# Patient Record
Sex: FEMALE | Race: WHITE | NOT HISPANIC OR LATINO | URBAN - METROPOLITAN AREA
[De-identification: names, ages, dates, MRNs, and addresses within clinical notes are randomized per-mention and may not be internally consistent; named-entity substitution may affect disease eponyms.]

---

## 2017-06-17 ENCOUNTER — OUTPATIENT (OUTPATIENT)
Dept: OUTPATIENT SERVICES | Facility: HOSPITAL | Age: 72
LOS: 1 days | Discharge: HOME | End: 2017-06-17

## 2017-06-17 DIAGNOSIS — I10 ESSENTIAL (PRIMARY) HYPERTENSION: ICD-10-CM

## 2017-06-17 DIAGNOSIS — R55 SYNCOPE AND COLLAPSE: ICD-10-CM

## 2017-06-17 DIAGNOSIS — G43.909 MIGRAINE, UNSPECIFIED, NOT INTRACTABLE, WITHOUT STATUS MIGRAINOSUS: ICD-10-CM

## 2017-06-17 PROBLEM — Z00.00 ENCOUNTER FOR PREVENTIVE HEALTH EXAMINATION: Status: ACTIVE | Noted: 2017-06-17

## 2017-06-20 ENCOUNTER — TRANSCRIPTION ENCOUNTER (OUTPATIENT)
Age: 72
End: 2017-06-20

## 2017-06-26 ENCOUNTER — OUTPATIENT (OUTPATIENT)
Dept: OUTPATIENT SERVICES | Facility: HOSPITAL | Age: 72
LOS: 1 days | Discharge: HOME | End: 2017-06-26

## 2017-06-26 DIAGNOSIS — I10 ESSENTIAL (PRIMARY) HYPERTENSION: ICD-10-CM

## 2017-06-26 DIAGNOSIS — G43.909 MIGRAINE, UNSPECIFIED, NOT INTRACTABLE, WITHOUT STATUS MIGRAINOSUS: ICD-10-CM

## 2017-06-26 DIAGNOSIS — R55 SYNCOPE AND COLLAPSE: ICD-10-CM

## 2017-06-28 DIAGNOSIS — F32.0 MAJOR DEPRESSIVE DISORDER, SINGLE EPISODE, MILD: ICD-10-CM

## 2017-06-28 DIAGNOSIS — F03.90 UNSPECIFIED DEMENTIA, UNSPECIFIED SEVERITY, WITHOUT BEHAVIORAL DISTURBANCE, PSYCHOTIC DISTURBANCE, MOOD DISTURBANCE, AND ANXIETY: ICD-10-CM

## 2017-06-28 DIAGNOSIS — N39.0 URINARY TRACT INFECTION, SITE NOT SPECIFIED: ICD-10-CM

## 2017-06-28 DIAGNOSIS — I10 ESSENTIAL (PRIMARY) HYPERTENSION: ICD-10-CM

## 2017-06-28 DIAGNOSIS — S42.001A FRACTURE OF UNSPECIFIED PART OF RIGHT CLAVICLE, INITIAL ENCOUNTER FOR CLOSED FRACTURE: ICD-10-CM

## 2017-06-28 DIAGNOSIS — L82.1 OTHER SEBORRHEIC KERATOSIS: ICD-10-CM

## 2017-06-28 DIAGNOSIS — E78.5 HYPERLIPIDEMIA, UNSPECIFIED: ICD-10-CM

## 2017-06-28 DIAGNOSIS — G43.909 MIGRAINE, UNSPECIFIED, NOT INTRACTABLE, WITHOUT STATUS MIGRAINOSUS: ICD-10-CM

## 2017-09-16 ENCOUNTER — OUTPATIENT (OUTPATIENT)
Dept: OUTPATIENT SERVICES | Facility: HOSPITAL | Age: 72
LOS: 1 days | Discharge: HOME | End: 2017-09-16

## 2017-09-16 DIAGNOSIS — G43.909 MIGRAINE, UNSPECIFIED, NOT INTRACTABLE, WITHOUT STATUS MIGRAINOSUS: ICD-10-CM

## 2017-09-16 DIAGNOSIS — R55 SYNCOPE AND COLLAPSE: ICD-10-CM

## 2017-09-16 DIAGNOSIS — Z01.818 ENCOUNTER FOR OTHER PREPROCEDURAL EXAMINATION: ICD-10-CM

## 2017-09-16 DIAGNOSIS — I10 ESSENTIAL (PRIMARY) HYPERTENSION: ICD-10-CM

## 2017-09-22 ENCOUNTER — OUTPATIENT (OUTPATIENT)
Dept: OUTPATIENT SERVICES | Facility: HOSPITAL | Age: 72
LOS: 1 days | Discharge: HOME | End: 2017-09-22

## 2017-09-22 DIAGNOSIS — G43.909 MIGRAINE, UNSPECIFIED, NOT INTRACTABLE, WITHOUT STATUS MIGRAINOSUS: ICD-10-CM

## 2017-09-22 DIAGNOSIS — I10 ESSENTIAL (PRIMARY) HYPERTENSION: ICD-10-CM

## 2017-09-22 DIAGNOSIS — D50.8 OTHER IRON DEFICIENCY ANEMIAS: ICD-10-CM

## 2017-09-22 DIAGNOSIS — R55 SYNCOPE AND COLLAPSE: ICD-10-CM

## 2018-02-16 ENCOUNTER — OUTPATIENT (OUTPATIENT)
Dept: OUTPATIENT SERVICES | Facility: HOSPITAL | Age: 73
LOS: 1 days | Discharge: HOME | End: 2018-02-16

## 2018-02-16 DIAGNOSIS — Z13.1 ENCOUNTER FOR SCREENING FOR DIABETES MELLITUS: ICD-10-CM

## 2018-02-16 DIAGNOSIS — K21.9 GASTRO-ESOPHAGEAL REFLUX DISEASE WITHOUT ESOPHAGITIS: ICD-10-CM

## 2018-02-16 DIAGNOSIS — M25.561 PAIN IN RIGHT KNEE: ICD-10-CM

## 2018-02-16 DIAGNOSIS — E78.5 HYPERLIPIDEMIA, UNSPECIFIED: ICD-10-CM

## 2018-02-16 DIAGNOSIS — E02 SUBCLINICAL IODINE-DEFICIENCY HYPOTHYROIDISM: ICD-10-CM

## 2018-02-16 DIAGNOSIS — R10.13 EPIGASTRIC PAIN: ICD-10-CM

## 2018-02-16 DIAGNOSIS — I10 ESSENTIAL (PRIMARY) HYPERTENSION: ICD-10-CM

## 2018-02-16 DIAGNOSIS — F32.0 MAJOR DEPRESSIVE DISORDER, SINGLE EPISODE, MILD: ICD-10-CM

## 2018-02-16 DIAGNOSIS — R63.0 ANOREXIA: ICD-10-CM

## 2018-02-24 ENCOUNTER — OUTPATIENT (OUTPATIENT)
Dept: OUTPATIENT SERVICES | Facility: HOSPITAL | Age: 73
LOS: 1 days | Discharge: HOME | End: 2018-02-24

## 2018-02-24 DIAGNOSIS — I10 ESSENTIAL (PRIMARY) HYPERTENSION: ICD-10-CM

## 2018-02-24 DIAGNOSIS — K21.9 GASTRO-ESOPHAGEAL REFLUX DISEASE WITHOUT ESOPHAGITIS: ICD-10-CM

## 2018-02-24 DIAGNOSIS — Z12.31 ENCOUNTER FOR SCREENING MAMMOGRAM FOR MALIGNANT NEOPLASM OF BREAST: ICD-10-CM

## 2018-02-24 DIAGNOSIS — E78.5 HYPERLIPIDEMIA, UNSPECIFIED: ICD-10-CM

## 2018-02-24 DIAGNOSIS — F32.0 MAJOR DEPRESSIVE DISORDER, SINGLE EPISODE, MILD: ICD-10-CM

## 2018-05-11 ENCOUNTER — APPOINTMENT (OUTPATIENT)
Dept: GASTROENTEROLOGY | Facility: CLINIC | Age: 73
End: 2018-05-11

## 2018-06-09 ENCOUNTER — OUTPATIENT (OUTPATIENT)
Dept: OUTPATIENT SERVICES | Facility: HOSPITAL | Age: 73
LOS: 1 days | Discharge: HOME | End: 2018-06-09

## 2018-06-09 DIAGNOSIS — I10 ESSENTIAL (PRIMARY) HYPERTENSION: ICD-10-CM

## 2018-06-09 DIAGNOSIS — K21.9 GASTRO-ESOPHAGEAL REFLUX DISEASE WITHOUT ESOPHAGITIS: ICD-10-CM

## 2018-06-09 DIAGNOSIS — H04.123 DRY EYE SYNDROME OF BILATERAL LACRIMAL GLANDS: ICD-10-CM

## 2018-06-09 DIAGNOSIS — F32.0 MAJOR DEPRESSIVE DISORDER, SINGLE EPISODE, MILD: ICD-10-CM

## 2018-06-09 DIAGNOSIS — E78.5 HYPERLIPIDEMIA, UNSPECIFIED: ICD-10-CM

## 2018-06-29 ENCOUNTER — OUTPATIENT (OUTPATIENT)
Dept: OUTPATIENT SERVICES | Facility: HOSPITAL | Age: 73
LOS: 1 days | Discharge: HOME | End: 2018-06-29

## 2018-06-29 DIAGNOSIS — I65.23 OCCLUSION AND STENOSIS OF BILATERAL CAROTID ARTERIES: ICD-10-CM

## 2018-06-29 DIAGNOSIS — M54.17 RADICULOPATHY, LUMBOSACRAL REGION: ICD-10-CM

## 2018-10-24 ENCOUNTER — OUTPATIENT (OUTPATIENT)
Dept: OUTPATIENT SERVICES | Facility: HOSPITAL | Age: 73
LOS: 1 days | Discharge: HOME | End: 2018-10-24

## 2018-10-24 DIAGNOSIS — R20.2 PARESTHESIA OF SKIN: ICD-10-CM

## 2018-10-24 DIAGNOSIS — M54.17 RADICULOPATHY, LUMBOSACRAL REGION: ICD-10-CM

## 2018-10-24 DIAGNOSIS — F32.0 MAJOR DEPRESSIVE DISORDER, SINGLE EPISODE, MILD: ICD-10-CM

## 2018-10-24 DIAGNOSIS — E78.5 HYPERLIPIDEMIA, UNSPECIFIED: ICD-10-CM

## 2018-10-24 DIAGNOSIS — I10 ESSENTIAL (PRIMARY) HYPERTENSION: ICD-10-CM

## 2018-10-24 DIAGNOSIS — K21.9 GASTRO-ESOPHAGEAL REFLUX DISEASE WITHOUT ESOPHAGITIS: ICD-10-CM

## 2018-10-24 DIAGNOSIS — L82.1 OTHER SEBORRHEIC KERATOSIS: ICD-10-CM

## 2018-10-24 DIAGNOSIS — I65.23 OCCLUSION AND STENOSIS OF BILATERAL CAROTID ARTERIES: ICD-10-CM

## 2019-03-01 ENCOUNTER — OUTPATIENT (OUTPATIENT)
Dept: OUTPATIENT SERVICES | Facility: HOSPITAL | Age: 74
LOS: 1 days | Discharge: HOME | End: 2019-03-01

## 2019-03-01 DIAGNOSIS — G43.909 MIGRAINE, UNSPECIFIED, NOT INTRACTABLE, WITHOUT STATUS MIGRAINOSUS: ICD-10-CM

## 2019-03-01 DIAGNOSIS — E55.9 VITAMIN D DEFICIENCY, UNSPECIFIED: ICD-10-CM

## 2019-03-01 DIAGNOSIS — I10 ESSENTIAL (PRIMARY) HYPERTENSION: ICD-10-CM

## 2019-03-01 DIAGNOSIS — E02 SUBCLINICAL IODINE-DEFICIENCY HYPOTHYROIDISM: ICD-10-CM

## 2019-03-01 DIAGNOSIS — E78.5 HYPERLIPIDEMIA, UNSPECIFIED: ICD-10-CM

## 2019-03-01 DIAGNOSIS — M54.17 RADICULOPATHY, LUMBOSACRAL REGION: ICD-10-CM

## 2019-03-01 DIAGNOSIS — R20.2 PARESTHESIA OF SKIN: ICD-10-CM

## 2019-03-01 DIAGNOSIS — Z13.1 ENCOUNTER FOR SCREENING FOR DIABETES MELLITUS: ICD-10-CM

## 2019-03-01 DIAGNOSIS — F32.0 MAJOR DEPRESSIVE DISORDER, SINGLE EPISODE, MILD: ICD-10-CM

## 2019-03-13 ENCOUNTER — OUTPATIENT (OUTPATIENT)
Dept: OUTPATIENT SERVICES | Facility: HOSPITAL | Age: 74
LOS: 1 days | Discharge: HOME | End: 2019-03-13

## 2019-03-13 DIAGNOSIS — Z12.31 ENCOUNTER FOR SCREENING MAMMOGRAM FOR MALIGNANT NEOPLASM OF BREAST: ICD-10-CM

## 2019-07-05 ENCOUNTER — OUTPATIENT (OUTPATIENT)
Dept: OUTPATIENT SERVICES | Facility: HOSPITAL | Age: 74
LOS: 1 days | Discharge: HOME | End: 2019-07-05

## 2019-07-05 DIAGNOSIS — F32.0 MAJOR DEPRESSIVE DISORDER, SINGLE EPISODE, MILD: ICD-10-CM

## 2019-07-05 DIAGNOSIS — I10 ESSENTIAL (PRIMARY) HYPERTENSION: ICD-10-CM

## 2019-07-05 DIAGNOSIS — E78.5 HYPERLIPIDEMIA, UNSPECIFIED: ICD-10-CM

## 2019-07-05 DIAGNOSIS — H61.21 IMPACTED CERUMEN, RIGHT EAR: ICD-10-CM

## 2019-07-05 DIAGNOSIS — R20.2 PARESTHESIA OF SKIN: ICD-10-CM

## 2019-07-08 ENCOUNTER — OUTPATIENT (OUTPATIENT)
Dept: OUTPATIENT SERVICES | Facility: HOSPITAL | Age: 74
LOS: 1 days | Discharge: HOME | End: 2019-07-08

## 2019-07-08 DIAGNOSIS — N39.0 URINARY TRACT INFECTION, SITE NOT SPECIFIED: ICD-10-CM

## 2019-11-08 ENCOUNTER — OUTPATIENT (OUTPATIENT)
Dept: OUTPATIENT SERVICES | Facility: HOSPITAL | Age: 74
LOS: 1 days | Discharge: HOME | End: 2019-11-08

## 2019-11-08 DIAGNOSIS — F32.0 MAJOR DEPRESSIVE DISORDER, SINGLE EPISODE, MILD: ICD-10-CM

## 2019-11-08 DIAGNOSIS — R42 DIZZINESS AND GIDDINESS: ICD-10-CM

## 2019-11-08 DIAGNOSIS — I10 ESSENTIAL (PRIMARY) HYPERTENSION: ICD-10-CM

## 2019-11-08 DIAGNOSIS — E78.5 HYPERLIPIDEMIA, UNSPECIFIED: ICD-10-CM

## 2019-11-08 DIAGNOSIS — M25.511 PAIN IN RIGHT SHOULDER: ICD-10-CM

## 2020-05-29 ENCOUNTER — OUTPATIENT (OUTPATIENT)
Dept: OUTPATIENT SERVICES | Facility: HOSPITAL | Age: 75
LOS: 1 days | Discharge: HOME | End: 2020-05-29
Payer: MEDICARE

## 2020-05-29 DIAGNOSIS — Z12.31 ENCOUNTER FOR SCREENING MAMMOGRAM FOR MALIGNANT NEOPLASM OF BREAST: ICD-10-CM

## 2020-05-29 PROCEDURE — 77067 SCR MAMMO BI INCL CAD: CPT | Mod: 26

## 2020-05-29 PROCEDURE — 77063 BREAST TOMOSYNTHESIS BI: CPT | Mod: 26

## 2020-06-01 DIAGNOSIS — N95.9 UNSPECIFIED MENOPAUSAL AND PERIMENOPAUSAL DISORDER: ICD-10-CM

## 2020-06-01 DIAGNOSIS — Z13.820 ENCOUNTER FOR SCREENING FOR OSTEOPOROSIS: ICD-10-CM

## 2020-07-31 ENCOUNTER — OUTPATIENT (OUTPATIENT)
Dept: OUTPATIENT SERVICES | Facility: HOSPITAL | Age: 75
LOS: 1 days | Discharge: HOME | End: 2020-07-31
Payer: MEDICARE

## 2020-07-31 DIAGNOSIS — R41.3 OTHER AMNESIA: ICD-10-CM

## 2020-07-31 PROCEDURE — 70551 MRI BRAIN STEM W/O DYE: CPT | Mod: 26

## 2021-04-28 ENCOUNTER — OUTPATIENT (OUTPATIENT)
Dept: OUTPATIENT SERVICES | Facility: HOSPITAL | Age: 76
LOS: 1 days | Discharge: HOME | End: 2021-04-28
Payer: MEDICARE

## 2021-04-28 DIAGNOSIS — R22.0 LOCALIZED SWELLING, MASS AND LUMP, HEAD: ICD-10-CM

## 2021-04-28 PROCEDURE — 71046 X-RAY EXAM CHEST 2 VIEWS: CPT | Mod: 26

## 2021-04-28 PROCEDURE — 76536 US EXAM OF HEAD AND NECK: CPT | Mod: 26

## 2021-08-05 ENCOUNTER — APPOINTMENT (OUTPATIENT)
Dept: VASCULAR SURGERY | Facility: CLINIC | Age: 76
End: 2021-08-05
Payer: MEDICARE

## 2021-08-05 VITALS
BODY MASS INDEX: 25.61 KG/M2 | HEART RATE: 88 BPM | DIASTOLIC BLOOD PRESSURE: 84 MMHG | TEMPERATURE: 98 F | HEIGHT: 64 IN | WEIGHT: 150 LBS | SYSTOLIC BLOOD PRESSURE: 157 MMHG

## 2021-08-05 DIAGNOSIS — Z87.891 PERSONAL HISTORY OF NICOTINE DEPENDENCE: ICD-10-CM

## 2021-08-05 PROCEDURE — 99204 OFFICE O/P NEW MOD 45 MIN: CPT

## 2021-08-05 PROCEDURE — 93880 EXTRACRANIAL BILAT STUDY: CPT

## 2021-08-05 NOTE — ASSESSMENT
[FreeTextEntry1] : 77 y/o female who underwent screening PVRs done that showed ARNOLDO of 0.91 on right and 1.05 on left. denies any claudication, c/o forgetfulness, and also felt lump to left neck.\par \par Carotid duplex showed <50% ICA stenosis bilaterally. She maintains pulses in the lower extremities bilaterally.\par \par No vascular surgical intervention is needed. She can follow up as needed.\par \par

## 2021-08-05 NOTE — DATA REVIEWED
[FreeTextEntry1] : I performed a carotid duplex which was medically necessary to evaluate for carotid stenosis. It showed <50% ICA stenosis bilaterally.\par

## 2021-08-05 NOTE — HISTORY OF PRESENT ILLNESS
[FreeTextEntry1] : 75 y/o female who underwent screening PVRs done that showed ARNOLDO of 0.91 on right and 1.05 on left. denies any claudication, c/o forgetfulness, and also felt lump to left neck.

## 2021-11-27 ENCOUNTER — OUTPATIENT (OUTPATIENT)
Dept: OUTPATIENT SERVICES | Facility: HOSPITAL | Age: 76
LOS: 1 days | Discharge: HOME | End: 2021-11-27
Payer: MEDICARE

## 2021-11-27 DIAGNOSIS — R10.9 UNSPECIFIED ABDOMINAL PAIN: ICD-10-CM

## 2021-11-27 PROCEDURE — 74177 CT ABD & PELVIS W/CONTRAST: CPT | Mod: 26

## 2022-01-19 ENCOUNTER — INPATIENT (INPATIENT)
Facility: HOSPITAL | Age: 77
LOS: 1 days | Discharge: HOME | End: 2022-01-21
Attending: INTERNAL MEDICINE | Admitting: INTERNAL MEDICINE
Payer: MEDICARE

## 2022-01-19 VITALS
DIASTOLIC BLOOD PRESSURE: 68 MMHG | OXYGEN SATURATION: 98 % | SYSTOLIC BLOOD PRESSURE: 124 MMHG | HEART RATE: 62 BPM | TEMPERATURE: 96 F | HEIGHT: 64 IN | WEIGHT: 169.98 LBS | RESPIRATION RATE: 18 BRPM

## 2022-01-19 LAB
ALBUMIN SERPL ELPH-MCNC: 4.5 G/DL — SIGNIFICANT CHANGE UP (ref 3.5–5.2)
ALP SERPL-CCNC: 94 U/L — SIGNIFICANT CHANGE UP (ref 30–115)
ALT FLD-CCNC: 11 U/L — SIGNIFICANT CHANGE UP (ref 0–41)
ANION GAP SERPL CALC-SCNC: 12 MMOL/L — SIGNIFICANT CHANGE UP (ref 7–14)
APPEARANCE UR: CLEAR — SIGNIFICANT CHANGE UP
APTT BLD: 32.7 SEC — SIGNIFICANT CHANGE UP (ref 27–39.2)
AST SERPL-CCNC: 15 U/L — SIGNIFICANT CHANGE UP (ref 0–41)
BACTERIA # UR AUTO: ABNORMAL
BASOPHILS # BLD AUTO: 0.04 K/UL — SIGNIFICANT CHANGE UP (ref 0–0.2)
BASOPHILS NFR BLD AUTO: 0.5 % — SIGNIFICANT CHANGE UP (ref 0–1)
BILIRUB SERPL-MCNC: 0.4 MG/DL — SIGNIFICANT CHANGE UP (ref 0.2–1.2)
BILIRUB UR-MCNC: NEGATIVE — SIGNIFICANT CHANGE UP
BUN SERPL-MCNC: 20 MG/DL — SIGNIFICANT CHANGE UP (ref 10–20)
CALCIUM SERPL-MCNC: 9.4 MG/DL — SIGNIFICANT CHANGE UP (ref 8.5–10.1)
CHLORIDE SERPL-SCNC: 105 MMOL/L — SIGNIFICANT CHANGE UP (ref 98–110)
CO2 SERPL-SCNC: 26 MMOL/L — SIGNIFICANT CHANGE UP (ref 17–32)
COD CRY URNS QL: NEGATIVE — SIGNIFICANT CHANGE UP
COLOR SPEC: YELLOW — SIGNIFICANT CHANGE UP
CREAT SERPL-MCNC: 1 MG/DL — SIGNIFICANT CHANGE UP (ref 0.7–1.5)
DIFF PNL FLD: ABNORMAL
EOSINOPHIL # BLD AUTO: 0.09 K/UL — SIGNIFICANT CHANGE UP (ref 0–0.7)
EOSINOPHIL NFR BLD AUTO: 1.1 % — SIGNIFICANT CHANGE UP (ref 0–8)
EPI CELLS # UR: ABNORMAL /HPF
GLUCOSE SERPL-MCNC: 100 MG/DL — HIGH (ref 70–99)
GLUCOSE UR QL: NEGATIVE MG/DL — SIGNIFICANT CHANGE UP
GRAN CASTS # UR COMP ASSIST: NEGATIVE — SIGNIFICANT CHANGE UP
HCT VFR BLD CALC: 38 % — SIGNIFICANT CHANGE UP (ref 37–47)
HGB BLD-MCNC: 12.7 G/DL — SIGNIFICANT CHANGE UP (ref 12–16)
HYALINE CASTS # UR AUTO: ABNORMAL /LPF
IMM GRANULOCYTES NFR BLD AUTO: 0.2 % — SIGNIFICANT CHANGE UP (ref 0.1–0.3)
INR BLD: 1.07 RATIO — SIGNIFICANT CHANGE UP (ref 0.65–1.3)
KETONES UR-MCNC: NEGATIVE — SIGNIFICANT CHANGE UP
LEUKOCYTE ESTERASE UR-ACNC: ABNORMAL
LYMPHOCYTES # BLD AUTO: 1.42 K/UL — SIGNIFICANT CHANGE UP (ref 1.2–3.4)
LYMPHOCYTES # BLD AUTO: 17 % — LOW (ref 20.5–51.1)
MAGNESIUM SERPL-MCNC: 1.7 MG/DL — LOW (ref 1.8–2.4)
MCHC RBC-ENTMCNC: 29.4 PG — SIGNIFICANT CHANGE UP (ref 27–31)
MCHC RBC-ENTMCNC: 33.4 G/DL — SIGNIFICANT CHANGE UP (ref 32–37)
MCV RBC AUTO: 88 FL — SIGNIFICANT CHANGE UP (ref 81–99)
MONOCYTES # BLD AUTO: 0.44 K/UL — SIGNIFICANT CHANGE UP (ref 0.1–0.6)
MONOCYTES NFR BLD AUTO: 5.3 % — SIGNIFICANT CHANGE UP (ref 1.7–9.3)
NEUTROPHILS # BLD AUTO: 6.35 K/UL — SIGNIFICANT CHANGE UP (ref 1.4–6.5)
NEUTROPHILS NFR BLD AUTO: 75.9 % — HIGH (ref 42.2–75.2)
NITRITE UR-MCNC: NEGATIVE — SIGNIFICANT CHANGE UP
NRBC # BLD: 0 /100 WBCS — SIGNIFICANT CHANGE UP (ref 0–0)
PH UR: 7 — SIGNIFICANT CHANGE UP (ref 5–8)
PLATELET # BLD AUTO: 261 K/UL — SIGNIFICANT CHANGE UP (ref 130–400)
POTASSIUM SERPL-MCNC: 3.7 MMOL/L — SIGNIFICANT CHANGE UP (ref 3.5–5)
POTASSIUM SERPL-SCNC: 3.7 MMOL/L — SIGNIFICANT CHANGE UP (ref 3.5–5)
PROT SERPL-MCNC: 6.6 G/DL — SIGNIFICANT CHANGE UP (ref 6–8)
PROT UR-MCNC: NEGATIVE MG/DL — SIGNIFICANT CHANGE UP
PROTHROM AB SERPL-ACNC: 12.3 SEC — SIGNIFICANT CHANGE UP (ref 9.95–12.87)
RBC # BLD: 4.32 M/UL — SIGNIFICANT CHANGE UP (ref 4.2–5.4)
RBC # FLD: 12.1 % — SIGNIFICANT CHANGE UP (ref 11.5–14.5)
RBC CASTS # UR COMP ASSIST: SIGNIFICANT CHANGE UP /HPF
SARS-COV-2 RNA SPEC QL NAA+PROBE: SIGNIFICANT CHANGE UP
SODIUM SERPL-SCNC: 143 MMOL/L — SIGNIFICANT CHANGE UP (ref 135–146)
SP GR SPEC: 1.01 — SIGNIFICANT CHANGE UP (ref 1.01–1.03)
TRI-PHOS CRY UR QL COMP ASSIST: NEGATIVE — SIGNIFICANT CHANGE UP
TROPONIN T SERPL-MCNC: <0.01 NG/ML — SIGNIFICANT CHANGE UP
URATE CRY FLD QL MICRO: NEGATIVE — SIGNIFICANT CHANGE UP
UROBILINOGEN FLD QL: 0.2 MG/DL — SIGNIFICANT CHANGE UP
WBC # BLD: 8.36 K/UL — SIGNIFICANT CHANGE UP (ref 4.8–10.8)
WBC # FLD AUTO: 8.36 K/UL — SIGNIFICANT CHANGE UP (ref 4.8–10.8)
WBC UR QL: ABNORMAL /HPF

## 2022-01-19 PROCEDURE — 93010 ELECTROCARDIOGRAM REPORT: CPT

## 2022-01-19 PROCEDURE — 71045 X-RAY EXAM CHEST 1 VIEW: CPT | Mod: 26

## 2022-01-19 PROCEDURE — 99221 1ST HOSP IP/OBS SF/LOW 40: CPT

## 2022-01-19 PROCEDURE — 70450 CT HEAD/BRAIN W/O DYE: CPT | Mod: 26,MA

## 2022-01-19 PROCEDURE — 99285 EMERGENCY DEPT VISIT HI MDM: CPT

## 2022-01-19 RX ORDER — SODIUM CHLORIDE 9 MG/ML
1000 INJECTION INTRAMUSCULAR; INTRAVENOUS; SUBCUTANEOUS
Refills: 0 | Status: DISCONTINUED | OUTPATIENT
Start: 2022-01-19 | End: 2022-01-20

## 2022-01-19 RX ORDER — SODIUM CHLORIDE 9 MG/ML
1000 INJECTION, SOLUTION INTRAVENOUS ONCE
Refills: 0 | Status: COMPLETED | OUTPATIENT
Start: 2022-01-19 | End: 2022-01-19

## 2022-01-19 RX ORDER — MAGNESIUM OXIDE 400 MG ORAL TABLET 241.3 MG
400 TABLET ORAL ONCE
Refills: 0 | Status: COMPLETED | OUTPATIENT
Start: 2022-01-19 | End: 2022-01-19

## 2022-01-19 RX ADMIN — SODIUM CHLORIDE 75 MILLILITER(S): 9 INJECTION INTRAMUSCULAR; INTRAVENOUS; SUBCUTANEOUS at 23:12

## 2022-01-19 RX ADMIN — MAGNESIUM OXIDE 400 MG ORAL TABLET 400 MILLIGRAM(S): 241.3 TABLET ORAL at 23:12

## 2022-01-19 RX ADMIN — SODIUM CHLORIDE 1000 MILLILITER(S): 9 INJECTION, SOLUTION INTRAVENOUS at 17:17

## 2022-01-19 NOTE — H&P ADULT - NSHPLABSRESULTS_GEN_ALL_CORE
12.7   8.36  )-----------( 261      ( 19 Jan 2022 17:20 )             38.0     01-19    143  |  105  |  20  ----------------------------<  100<H>  3.7   |  26  |  1.0    Ca    9.4      19 Jan 2022 17:20  Mg     1.7     01-19    TPro  6.6  /  Alb  4.5  /  TBili  0.4  /  DBili  x   /  AST  15  /  ALT  11  /  AlkPhos  94  01-19            PT/INR - ( 19 Jan 2022 17:20 )   PT: 12.30 sec;   INR: 1.07 ratio         PTT - ( 19 Jan 2022 17:20 )  PTT:32.7 sec  Lactate Trend    CARDIAC MARKERS ( 19 Jan 2022 17:20 )  x     / <0.01 ng/mL / x     / x     / x        CAPILLARY BLOOD GLUCOSE    < from: CT Head No Cont (01.19.22 @ 17:28) >    ACC: [id number] EXAM:  CT BRAIN                          PROCEDURE DATE:  01/19/2022      < from: CT Head No Cont (01.19.22 @ 17:28) >    pression:    1. No evidence of acute intracranial hemorrhage or mass effect.    SPENCER MCKEON MD; Attending Radiologist  This document has been electronically signed. Jan 19 2022  6:14PM    < end of copied text >

## 2022-01-19 NOTE — ED ADULT NURSE NOTE - CHPI ED NUR SEVERITY2
Racheal S [facility]                        To Whom It May Concern,    This is to certify that Racheal was seen in the clinic on 5/17/2018.  She may return to work without restrictions on 6/11/18.    REMARKS:         SIGNATURE:_________________________________________, 5/17/2018     Odette Whatley MD                               [facility]  Obstetrics and Gynecology   [address] [place] [address]  [phone]    
PAIN SCALE 5 OF 10.

## 2022-01-19 NOTE — ED PROVIDER NOTE - CLINICAL SUMMARY MEDICAL DECISION MAKING FREE TEXT BOX
Labs and imaging obtained. Results reviewed and discussed with pt and daughter,  Given that this is second episode will plan to admit

## 2022-01-19 NOTE — H&P ADULT - ASSESSMENT
Near syncope    magnesium deficiency Near syncope- telemetry, repeat cardiac enzymes in am with EKG, ECHO    magnesium deficiency- suuplement    medication management- Patient doesn't know names but says they are all "life style" medications

## 2022-01-19 NOTE — H&P ADULT - HISTORY OF PRESENT ILLNESS
75yo female [resents to the ER after collapsing while in a supermarket. She tells me she became weak and started falling to the ground but was prevented from hitting fall by bystanders. Tells me she did not have actual loss of consciousness. Also denies chest pain, palpitations or headache but adds she didn't eat anything today. She also tells me this has happened once before and at that time she was observed in the hospital ([facility] and then sent home

## 2022-01-19 NOTE — ED ADULT TRIAGE NOTE - CHIEF COMPLAINT QUOTE
BIBA, as per EMS "pt was on line at [employer], near syncopal episode, BP was low on scene, IV access obtained 1LNS given in field"

## 2022-01-19 NOTE — ED ADULT NURSE REASSESSMENT NOTE - NS ED NURSE REASSESS COMMENT FT1
Report received from Lillian RENE. Pt received aaox3 in NAD. Pt placed on cardiac monitor with NSR noted. Pt denies any pain at present time. Pt pending admission orders.

## 2022-01-19 NOTE — ED PROVIDER NOTE - ATTENDING CONTRIBUTION TO CARE
75 yo F presents for evaluation after passing out in store.  Pt states she felt lightheaded and felt herself falling  States she was helped by bystanders and does not think she lost consciousness. + vomiting after incident, Found with low BP by EMS,  Responded well to IVFs, no CP, no SOB, no numbness or tingling, Family at bedside states that this happened once before.  Pt can not recall her meds, but daughter states that pt was on Namenda but stopped taking it.  On exam pt in NAD AAO x 3, no signs of head trauma, PERRL, speech is clear and fluent, face symmetrical, MMM, Lungs cta b/l, abd soft nt nd, no edema, good tone, equal strength,

## 2022-01-19 NOTE — ED PROVIDER NOTE - CARDIAC, MLM
See scanned session report.   Normal rate, regular rhythm.  Heart sounds S1, S2.  No murmurs, rubs or gallops.

## 2022-01-19 NOTE — ED ADULT NURSE NOTE - OBJECTIVE STATEMENT
Near syncope occurred at the store. Denies hitting head. lowered to floor by bystander, denies hitting head. NO cp/sob/N/V/D. No palpitations. Cannot remember her meds but reports self dc her namenda and aricept as "they make me feel ill"

## 2022-01-19 NOTE — ED PROVIDER NOTE - OBJECTIVE STATEMENT
Patient c/o near syncope at store today, Found by EMS< low BP, now better,, no chest pain, no SOB, no abdominal pain, no back pain, Had similar episode 6 mos ago but never seen by MD.

## 2022-01-20 ENCOUNTER — TRANSCRIPTION ENCOUNTER (OUTPATIENT)
Age: 77
End: 2022-01-20

## 2022-01-20 LAB — TROPONIN T SERPL-MCNC: <0.01 NG/ML — SIGNIFICANT CHANGE UP

## 2022-01-20 PROCEDURE — 93010 ELECTROCARDIOGRAM REPORT: CPT

## 2022-01-20 PROCEDURE — 99233 SBSQ HOSP IP/OBS HIGH 50: CPT

## 2022-01-20 PROCEDURE — 33285 INSJ SUBQ CAR RHYTHM MNTR: CPT

## 2022-01-20 PROCEDURE — 99223 1ST HOSP IP/OBS HIGH 75: CPT | Mod: 57,25

## 2022-01-20 RX ORDER — SODIUM CHLORIDE 9 MG/ML
1000 INJECTION, SOLUTION INTRAVENOUS ONCE
Refills: 0 | Status: COMPLETED | OUTPATIENT
Start: 2022-01-20 | End: 2022-01-20

## 2022-01-20 RX ORDER — CEPHALEXIN 500 MG
500 CAPSULE ORAL ONCE
Refills: 0 | Status: COMPLETED | OUTPATIENT
Start: 2022-01-20 | End: 2022-01-20

## 2022-01-20 RX ADMIN — Medication 500 MILLIGRAM(S): at 14:50

## 2022-01-20 RX ADMIN — SODIUM CHLORIDE 1000 MILLILITER(S): 9 INJECTION, SOLUTION INTRAVENOUS at 10:26

## 2022-01-20 NOTE — DISCHARGE NOTE PROVIDER - CARE PROVIDERS DIRECT ADDRESSES
[email or web address]

## 2022-01-20 NOTE — DISCHARGE NOTE PROVIDER - NSDCFUADDAPPT_GEN_ALL_CORE_FT
Please attend your appointment with Dr Smalls. It is important you let him know that you need a referral to a cardiologist to work up your mitral regurgitation.     Please make an appointment to see your primary care doctor. You are having worsening memory problems that likely need further management.

## 2022-01-20 NOTE — CHART NOTE - NSCHARTNOTEFT_GEN_A_CORE
EP PROCEDURE NOTE    Date of Procedure: 01-20-22  EP Attending: Dr. Smalls  Assistant: CAROL ANN Jansen  Referring Physician: Dr. Hicks  Procedure: Loop Recorder Implant    Indication: 76y Female with history of syncope referred for implantable loop recorder.     Anesthesia: Local    EQUIPMENT IMPLANTED  : [employer]onic Linq  Model Number: [id number]  Serial Number: [id number]    PROCEDURE DESCRIPTION  The patient was brought to the electrophysiology procedure area in a non-sedated state and received preoperative antibiotics. Informed, written consent was obtained prior to the procedure. The left anterior chest region was prepped and cleaned from the nipple to the upper left clavicular border with serial applications of Chlorhexidine. Patient was then covered with sterile drapes in the usual manner. Blood pressure, oxygenation, and level of comfort were stable throughout.     Following infiltration with local anesthetic, a 1-cm incision was made along the left sternal border at the fourth intercostal space. Using the tunneling device the implantable loop recorder was inserted into the subcutaneous tissue. Direct pressure was applied to the wound to obtain hemostasis. The wound was approximated with Dermabond. Steri-strips and a dry, sterile dressing were placed over the wound. R waves were noted to be .35 mV.     The patient tolerated the procedure well.     COMPLICATIONS  No immediate complications    CONCLUSIONS  Successful loop recorder implant    EBL: 2 cc

## 2022-01-20 NOTE — PROGRESS NOTE ADULT - ASSESSMENT
#presyncope  -sudden onset generalized weakness, chest pressure, palpitations prior to presyncopal episode  -no LOC, no postictal state, no diaphoresis  -several prior episodes; second time hospitalized for this issue  -Trops neg, EKG with trifascicular block pending repeat EKG        Near syncope- telemetry, repeat cardiac enzymes in am with EKG, ECHO    magnesium deficiency- suuplement    medication management- Patient doesn't know names but says they are all "life style" medications      #presyncope  -sudden onset generalized weakness, chest pressure, palpitations prior to presyncopal episode  -no LOC, no postictal state, no diaphoresis  -several prior episodes; second time hospitalized for this issue  -Trops neg, EKG with trifascicular block pending repeat EKG  -pending repeat trops, repeat ekg; ep consult for abnormal ekg on admission    #medication management  -Patient doesn't know names but says they are all "life style" medications    #Dementia likely alzheimer's   -pt with intermittent Short term memory loss, worsening and severe per daughter  -pt does not follow up with PMD; Dr Waters  -per daughter was on namenda, not compliant  -CT head neg for acute pathology

## 2022-01-20 NOTE — PATIENT PROFILE ADULT - FALL HARM RISK - HARM RISK INTERVENTIONS

## 2022-01-20 NOTE — DISCHARGE NOTE PROVIDER - NSDCFUADDINST_GEN_ALL_CORE_FT
You should follow up with Dr Smalls, for your EP appointment regarding your loop recorder. Please let him know you need an appointment with a cardiologist to help work up your mitral regurgitation. The cardiology consult team was contacted on the day of your discharge. They recommend you ask Dr Smalls for a cardiology refferal as no cardiologist saw you this admission.     Echocardiogram: A copy of your echocardiogram results were given to you on discharge. Please take this to your primary care doctor and electrophysiologist appointment.

## 2022-01-20 NOTE — PROGRESS NOTE ADULT - ASSESSMENT
77 yo female with PMH of Dementia was brought to ED by EMS after episode of dizziness and pre-syncope, patient was at store standing waiting for the  when she felt dizzy, lightheaded weak and was going to fall, a standby person grabbed the patient helped her to sit on the chair, she didn't lose her consciousness, she also felt chest discomfort and palpitation, when the EMS arrived her BP was low. In the ED /68 HR 62, EKG showed RBBB, LAFB and 1st deg AV block. Troponin was negative.     A/P:   Pre-Syncope  Orthostatic Hypotension: likely the cause of dizziness, possibly from poor oral intake vs autonomic dysfunction.   Neuro exam showed normal CN II-XII, strength 5/5 in all limbs. Cardiac exam no significant murmurs.   EKG showed trifascicular block RBBB, LAFB and 1st degree AV block, NST changes.   Troponin negative.   Orthostatic changes positive.   This is the 2nd episode per patient.   Trifascicular unclear if significant, consult EP, possibly need further cardiac monitor outpatient.   Get echo, continue telemetry monitor.   IV fluid challenge, recheck orthostatic.     Dementia: progressive, likely Alzheimer's   Patient is taking medications per daughter.   Head CT was negative.,   Neurology follow up outpatient.     #Progress Note Handoff:  Pending (specify): EP, echo.   Family discussion: with daughter at bedside, discussed plan of care.   Disposition: Home.

## 2022-01-20 NOTE — DISCHARGE NOTE PROVIDER - HOSPITAL COURSE
HPI:  75yo female [resents to the ER after collapsing while in a supermarket. She tells me she became weak and started falling to the ground but was prevented from hitting fall by bystanders. Tells me she did not have actual loss of consciousness. Also denies chest pain, palpitations or headache but adds she didn't eat anything today. She also tells me this has happened once before and at that time she was observed in the hospital ([facility] and then sent home  (19 Jan 2022 19:43)    Hospital course  near syncope  episode of near syncope in supermarket. CT head negative. Trops negative. Ortho statics positive in ED, received IV fluids. Repeat orthostatics morning after again posititve. Received one liter bolus. Pt responded and no longer orthostatic positive. Will repeat orthostatics in one day. EKG showed trifascicular block RBBB, LAFB and 1st degree AV block. With hx of multiple presyncopal events and second hospitalization, EP was consulted and placed loop recorder. Pt to follow up with Dr Smalls o/p.     Other issues  Dementia  -on meds @ home per daughter  -noncompliant with meds and f/u appts  -worsening memory   HPI:  77yo female [resents to the ER after collapsing while in a supermarket. She tells me she became weak and started falling to the ground but was prevented from hitting fall by bystanders. Tells me she did not have actual loss of consciousness. Also denies chest pain, palpitations or headache but adds she didn't eat anything today. She also tells me this has happened once before and at that time she was observed in the hospital ([facility] and then sent home  (19 Jan 2022 19:43)    Hospital course  near syncope  episode of near syncope in supermarket. CT head negative. Trops negative. Ortho statics positive in ED, received IV fluids. Repeat orthostatics morning after again posititve. Received one liter bolus. Pt responded and no longer orthostatic positive. Will repeat orthostatics in one day. EKG showed trifascicular block RBBB, LAFB and 1st degree AV block. With hx of multiple presyncopal events and second hospitalization, EP was consulted and placed loop recorder. Pt to follow up with Dr Smalls o/p.     Other issues  Dementia  -on meds @ home per daughter  -noncompliant with meds and f/u appts  -worsening memory    dlfjf HPI:  75yo female [resents to the ER after collapsing while in a supermarket. She tells me she became weak and started falling to the ground but was prevented from hitting fall by bystanders. Tells me she did not have actual loss of consciousness. Also denies chest pain, palpitations or headache but adds she didn't eat anything today. She also tells me this has happened once before and at that time she was observed in the hospital ([facility] and then sent home  (19 Jan 2022 19:43)    Hospital course  near syncope  episode of near syncope in supermarket. CT head negative. Trops negative. Ortho statics positive in ED, received IV fluids. Repeat orthostatics morning after again posititve. Received one liter bolus. Pt responded and no longer orthostatic positive. Will repeat orthostatics in one day. EKG showed trifascicular block RBBB, LAFB and 1st degree AV block. With hx of multiple presyncopal events and second hospitalization, EP was consulted and placed loop recorder. Pt to follow up with Dr Smalls o/p. Echo shows mod-severe mitral regurgitaion. 60% EF. Pt needs to f/u o/p with cardiologist and ep. Pt informed of care and aftercare instructions. Pt's daughter also informed. Pt is stable for discharge. Vitals stable.     Other issues  Dementia  -on meds @ home per daughter  -noncompliant with meds and f/u appts  -worsening memory

## 2022-01-20 NOTE — CONSULT NOTE ADULT - SUBJECTIVE AND OBJECTIVE BOX
HPI:  77yo female [resents to the ER after collapsing while in a supermarket. She tells me she became weak and started falling to the ground but was prevented from hitting fall by bystanders. Tells me she did not have actual loss of consciousness. Also denies chest pain, palpitations or headache but adds she didn't eat anything today. She also tells me this has happened once before and at that time she was observed in the hospital ([facility] and then sent home  (19 Jan 2022 19:43)    These episodes have been happening on multiple occasions for the last 3 years    PAST MEDICAL & SURGICAL HISTORY  HTN          FAMILY HISTORY:  no cad        SOCIAL HISTORY:  [x]exsmoker  []Alcohol  []Drug    ALLERGIES:  No Known Allergies      HOME MEDICATIONS:  amlodipine   hctz      VITALS:   T(F): 97.6 (01-20 @ 04:44), Max: 97.8 (01-19 @ 19:50)  HR: 90 (01-20 @ 00:36) (62 - 90)  BP: 153/76 (01-20 @ 00:36) (124/68 - 153/76)  BP(mean): --  RR: 18 (01-20 @ 04:44) (17 - 18)  SpO2: 99% (01-20 @ 00:36) (97% - 99%)    I&O's Summary    19 Jan 2022 07:01  -  20 Jan 2022 07:00  --------------------------------------------------------  IN: 420 mL / OUT: 400 mL / NET: 20 mL        REVIEW OF SYSTEMS:  CONSTITUTIONAL: No weakness, fevers or chills  EYES: No visual changes  ENT: (+) dizziness no throat pain   NECK: No pain or stiffness  RESPIRATORY: No cough, wheezing, hemoptysis; (+) shortness of breath  CARDIOVASCULAR: No chest pain (+) palpitations  GASTROINTESTINAL: No abdominal or epigastric pain. No nausea, vomiting, or hematemesis; No diarrhea or constipation. No melena or hematochezia.  GENITOURINARY: No dysuria, frequency or hematuria  NEUROLOGICAL: No numbness or weakness  SKIN: No itching, no rashes  MSK: No pain    PHYSICAL EXAM:  NEURO: patient is awake , alert and oriented  GEN: Not in acute distress  NECK: no thyroid enlargement, no JVD  LUNGS: Clear to auscultation bilaterally   CARDIOVASCULAR: S1/S2 present, RRR , no murmurs or rubs, no carotid bruits,  + PP bilaterally  ABD: Soft, non-tender, non-distended, +BS  EXT: No ARLEY  SKIN: Intact    LABS:                        12.7   8.36  )-----------( 261      ( 19 Jan 2022 17:20 )             38.0     01-19    143  |  105  |  20  ----------------------------<  100<H>  3.7   |  26  |  1.0    Ca    9.4      19 Jan 2022 17:20  Mg     1.7     01-19    TPro  6.6  /  Alb  4.5  /  TBili  0.4  /  DBili  x   /  AST  15  /  ALT  11  /  AlkPhos  94  01-19    PT/INR - ( 19 Jan 2022 17:20 )   PT: 12.30 sec;   INR: 1.07 ratio         PTT - ( 19 Jan 2022 17:20 )  PTT:32.7 sec  Troponin T, Serum: <0.01 ng/mL (01-19-22 @ 17:20)    CARDIAC MARKERS ( 19 Jan 2022 17:20 )  x     / <0.01 ng/mL / x     / x     / x            Troponin trend:            RADIOLOGY:  -CXR: no acute pathology    -TTE: pending    ECG:  sinus rhythm, 1st degree AV block, RBBB, Left axis deviation    TELEMETRY EVENTS:  intermiteently non conducted P wave (Blocked PAC vs Mobitz II)

## 2022-01-20 NOTE — DISCHARGE NOTE PROVIDER - NSDCCPCAREPLAN_GEN_ALL_CORE_FT
PRINCIPAL DISCHARGE DIAGNOSIS  Diagnosis: Near syncope  Assessment and Plan of Treatment:        PRINCIPAL DISCHARGE DIAGNOSIS  Diagnosis: Near syncope  Assessment and Plan of Treatment: Near syncope, also called presyncope, is the feeling that you may faint (lose consciousness), but you do not. You can control some health conditions that cause near syncope. Your healthcare providers can help you create a plan to manage near syncope and prevent episodes.  Your nearsyncope was worked up and you were found to be severely dehydrated. We gave you IVF fluids. Your orthostasis was corrected on subsequent blood pressure testing.   Return to the emergency department if:  You have sudden chest pain.  You have trouble breathing or shortness of breath.  You have vision changes, are sweating, and have nausea while you are sitting or lying down.  You feel dizzy or flushed and your heart is fluttering.  You lose consciousness.  You cannot use your arm, hand, foot, or leg, or it feels weak.  You have trouble speaking or understanding others when they speak.  Contact your healthcare provider if:  You have new or worsening symptoms.  Your heart beats faster or slower than usual.  You have questions or concerns about your condition or care.        SECONDARY DISCHARGE DIAGNOSES  Diagnosis: Heart block  Assessment and Plan of Treatment: Ekg was concerning for conduction abnormalities which could lead to fatal arrhythmias. This will need further work up using the loop recorder that our electrophysiology team placed. You need to follow up with EP to evaluate the loop recorder's information.     PRINCIPAL DISCHARGE DIAGNOSIS  Diagnosis: Near syncope  Assessment and Plan of Treatment: Near syncope, also called presyncope, is the feeling that you may faint (lose consciousness), but you do not. You can control some health conditions that cause near syncope. Your healthcare providers can help you create a plan to manage near syncope and prevent episodes.  Your nearsyncope was worked up and you were found to be severely dehydrated. We gave you IVF fluids. Your orthostasis was corrected on subsequent blood pressure testing.   Return to the emergency department if:  You have sudden chest pain.  You have trouble breathing or shortness of breath.  You have vision changes, are sweating, and have nausea while you are sitting or lying down.  You feel dizzy or flushed and your heart is fluttering.  You lose consciousness.  You cannot use your arm, hand, foot, or leg, or it feels weak.  You have trouble speaking or understanding others when they speak.  Contact your healthcare provider if:  You have new or worsening symptoms.  Your heart beats faster or slower than usual.  You have questions or concerns about your condition or care.        SECONDARY DISCHARGE DIAGNOSES  Diagnosis: Heart block  Assessment and Plan of Treatment: Ekg was concerning for conduction abnormalities which could lead to fatal arrhythmias. This will need further work up using the loop recorder that our electrophysiology team placed. You need to follow up with EP to evaluate the loop recorder's information and whether or not you need a pacemaker.

## 2022-01-20 NOTE — CONSULT NOTE ADULT - ASSESSMENT
IMPRESSION:  - Recurrent episodes of presyncope (feels palpitations, sob, then dizzy) - r/o AV block, vs orthostatic, vs vasovagal  - ECG with RBBB, 1st degree AV block and left axis deviation  - Telemetry showing intermittent non-conducted P waves (Blocked PAC vs Mobitz II)  - HTN on HCTZ and amlodipine    PLAN:  - Check 2d echo  - Check orthostatic vitals  - Will offer patient a loop recorder before discharge  - Follow-up with EP as outpatient  IMPRESSION:  - Recurrent episodes of presyncope (feels palpitations, sob, then dizzy) - r/o AV block, vs orthostatic, vs vasovagal  - ECG with RBBB, 1st degree AV block and left axis deviation  - Telemetry showing intermittent non-conducted P waves (likely Blocked PAC)  - HTN on HCTZ and amlodipine    PLAN:  - Check 2d echo  - Check orthostatic vitals  - Will offer patient a loop recorder before discharge  - Follow-up with EP as outpatient

## 2022-01-20 NOTE — DISCHARGE NOTE PROVIDER - PROVIDER TOKENS
PROVIDER:[TOKEN:[[id number]],FOLLOWUP:[2 weeks]],PROVIDER:[TOKEN:[[id number]],FOLLOWUP:[2 weeks],ESTABLISHEDPATIENT:[T]]

## 2022-01-20 NOTE — CONSULT NOTE ADULT - ATTENDING COMMENTS
Recurrent Syncope  No evidence of AV block on tely   Chronic bifascicular block  Recommend ILR implant  Risks/benefits discussed with patient and daughter ; Plan for ILR today

## 2022-01-20 NOTE — DISCHARGE NOTE PROVIDER - CARE PROVIDER_API CALL
Otis Smalls)  Cardiac Electrophysiology; Cardiovascular Disease; [facility]  [address] [place] [address]  Phone: [phone]  Fax: [phone]  Follow Up Time: 2 weeks    Saleem Waters ()  Family Medicine  [address] [place] [address]  Phone: [phone]  Fax: [phone]  Established Patient  Follow Up Time: 2 weeks

## 2022-01-21 ENCOUNTER — TRANSCRIPTION ENCOUNTER (OUTPATIENT)
Age: 77
End: 2022-01-21

## 2022-01-21 VITALS — DIASTOLIC BLOOD PRESSURE: 74 MMHG | HEART RATE: 91 BPM | RESPIRATION RATE: 18 BRPM | SYSTOLIC BLOOD PRESSURE: 165 MMHG

## 2022-01-21 LAB
ALBUMIN SERPL ELPH-MCNC: 3.8 G/DL — SIGNIFICANT CHANGE UP (ref 3.5–5.2)
ALP SERPL-CCNC: 74 U/L — SIGNIFICANT CHANGE UP (ref 30–115)
ALT FLD-CCNC: 8 U/L — SIGNIFICANT CHANGE UP (ref 0–41)
ANION GAP SERPL CALC-SCNC: 13 MMOL/L — SIGNIFICANT CHANGE UP (ref 7–14)
AST SERPL-CCNC: 15 U/L — SIGNIFICANT CHANGE UP (ref 0–41)
BASOPHILS # BLD AUTO: 0.02 K/UL — SIGNIFICANT CHANGE UP (ref 0–0.2)
BASOPHILS NFR BLD AUTO: 0.4 % — SIGNIFICANT CHANGE UP (ref 0–1)
BILIRUB SERPL-MCNC: 0.4 MG/DL — SIGNIFICANT CHANGE UP (ref 0.2–1.2)
BUN SERPL-MCNC: 13 MG/DL — SIGNIFICANT CHANGE UP (ref 10–20)
CALCIUM SERPL-MCNC: 9 MG/DL — SIGNIFICANT CHANGE UP (ref 8.5–10.1)
CHLORIDE SERPL-SCNC: 106 MMOL/L — SIGNIFICANT CHANGE UP (ref 98–110)
CO2 SERPL-SCNC: 24 MMOL/L — SIGNIFICANT CHANGE UP (ref 17–32)
CREAT SERPL-MCNC: 0.7 MG/DL — SIGNIFICANT CHANGE UP (ref 0.7–1.5)
EOSINOPHIL # BLD AUTO: 0.16 K/UL — SIGNIFICANT CHANGE UP (ref 0–0.7)
EOSINOPHIL NFR BLD AUTO: 2.9 % — SIGNIFICANT CHANGE UP (ref 0–8)
GLUCOSE SERPL-MCNC: 92 MG/DL — SIGNIFICANT CHANGE UP (ref 70–99)
HCT VFR BLD CALC: 32.8 % — LOW (ref 37–47)
HGB BLD-MCNC: 10.9 G/DL — LOW (ref 12–16)
IMM GRANULOCYTES NFR BLD AUTO: 0.2 % — SIGNIFICANT CHANGE UP (ref 0.1–0.3)
LYMPHOCYTES # BLD AUTO: 1.98 K/UL — SIGNIFICANT CHANGE UP (ref 1.2–3.4)
LYMPHOCYTES # BLD AUTO: 36.1 % — SIGNIFICANT CHANGE UP (ref 20.5–51.1)
MAGNESIUM SERPL-MCNC: 1.7 MG/DL — LOW (ref 1.8–2.4)
MCHC RBC-ENTMCNC: 28.7 PG — SIGNIFICANT CHANGE UP (ref 27–31)
MCHC RBC-ENTMCNC: 33.2 G/DL — SIGNIFICANT CHANGE UP (ref 32–37)
MCV RBC AUTO: 86.3 FL — SIGNIFICANT CHANGE UP (ref 81–99)
MONOCYTES # BLD AUTO: 0.4 K/UL — SIGNIFICANT CHANGE UP (ref 0.1–0.6)
MONOCYTES NFR BLD AUTO: 7.3 % — SIGNIFICANT CHANGE UP (ref 1.7–9.3)
NEUTROPHILS # BLD AUTO: 2.91 K/UL — SIGNIFICANT CHANGE UP (ref 1.4–6.5)
NEUTROPHILS NFR BLD AUTO: 53.1 % — SIGNIFICANT CHANGE UP (ref 42.2–75.2)
NRBC # BLD: 0 /100 WBCS — SIGNIFICANT CHANGE UP (ref 0–0)
PLATELET # BLD AUTO: 221 K/UL — SIGNIFICANT CHANGE UP (ref 130–400)
POTASSIUM SERPL-MCNC: 3.2 MMOL/L — LOW (ref 3.5–5)
POTASSIUM SERPL-SCNC: 3.2 MMOL/L — LOW (ref 3.5–5)
PROT SERPL-MCNC: 5.6 G/DL — LOW (ref 6–8)
RBC # BLD: 3.8 M/UL — LOW (ref 4.2–5.4)
RBC # FLD: 12.2 % — SIGNIFICANT CHANGE UP (ref 11.5–14.5)
SODIUM SERPL-SCNC: 143 MMOL/L — SIGNIFICANT CHANGE UP (ref 135–146)
WBC # BLD: 5.48 K/UL — SIGNIFICANT CHANGE UP (ref 4.8–10.8)
WBC # FLD AUTO: 5.48 K/UL — SIGNIFICANT CHANGE UP (ref 4.8–10.8)

## 2022-01-21 PROCEDURE — 99239 HOSP IP/OBS DSCHRG MGMT >30: CPT

## 2022-01-21 RX ORDER — POTASSIUM CHLORIDE 20 MEQ
40 PACKET (EA) ORAL ONCE
Refills: 0 | Status: COMPLETED | OUTPATIENT
Start: 2022-01-21 | End: 2022-01-21

## 2022-01-21 RX ORDER — ENOXAPARIN SODIUM 100 MG/ML
40 INJECTION SUBCUTANEOUS DAILY
Refills: 0 | Status: DISCONTINUED | OUTPATIENT
Start: 2022-01-21 | End: 2022-01-21

## 2022-01-21 RX ORDER — MAGNESIUM SULFATE 500 MG/ML
2 VIAL (ML) INJECTION ONCE
Refills: 0 | Status: COMPLETED | OUTPATIENT
Start: 2022-01-21 | End: 2022-01-21

## 2022-01-21 RX ORDER — ACETAMINOPHEN 500 MG
650 TABLET ORAL EVERY 6 HOURS
Refills: 0 | Status: DISCONTINUED | OUTPATIENT
Start: 2022-01-21 | End: 2022-01-21

## 2022-01-21 RX ADMIN — Medication 40 MILLIEQUIVALENT(S): at 11:46

## 2022-01-21 RX ADMIN — Medication 25 GRAM(S): at 11:47

## 2022-01-21 RX ADMIN — Medication 650 MILLIGRAM(S): at 06:49

## 2022-01-21 RX ADMIN — Medication 650 MILLIGRAM(S): at 06:19

## 2022-01-21 RX ADMIN — ENOXAPARIN SODIUM 40 MILLIGRAM(S): 100 INJECTION SUBCUTANEOUS at 11:46

## 2022-01-21 NOTE — DISCHARGE NOTE NURSING/CASE MANAGEMENT/SOCIAL WORK - PATIENT PORTAL LINK FT
You can access the FollowMyHealth Patient Portal offered by [facility] by registering at the following website: [email or web address]. By joining [employer]’s FollowMyHealth portal, you will also be able to view your health information using other applications (apps) compatible with our system.

## 2022-01-21 NOTE — PROGRESS NOTE ADULT - ASSESSMENT
77 yo female with PMH of Dementia was brought to ED by EMS after episode of dizziness and pre-syncope, patient was at store standing waiting for the  when she felt dizzy, lightheaded weak and was going to fall, a standby person grabbed the patient helped her to sit on the chair, she didn't lose her consciousness, she also felt chest discomfort and palpitation, when the EMS arrived her BP was low. In the ED /68 HR 62, EKG showed RBBB, LAFB and 1st deg AV block. Troponin was negative.     A/P:   Pre-Syncope  Orthostatic Hypotension: likely the cause of dizziness, possibly from poor oral intake improved.   Neuro exam showed normal CN II-XII, strength 5/5 in all limbs. Cardiac exam no significant murmurs.   EKG showed trifascicular block RBBB, LAFB and 1st degree AV block, NST changes.   Troponin negative.   Orthostatic changes positive improved with fluid challenge.   This is the 2nd episode per patient.   Trifascicular unclear if significant, s/p loop recorder placement.   Echo showed normal LVEF, mod to severe MR.  Follow up with EP outpatient.     Mitral regurgitation:   Mod to severe, patient has no symptoms.   Cardiology follow up outpatient.       Dementia: progressive, likely Alzheimer's   Patient is taking medications per daughter.   Head CT was negative.,   Neurology follow up outpatient.     #Progress Note Handoff:  Pending (specify): .   Family discussion: with daughter at bedside, discussed plan of care.   Disposition: Home today.

## 2022-01-21 NOTE — PROGRESS NOTE ADULT - ASSESSMENT
#presyncope  -sudden onset generalized weakness, chest pressure, palpitations prior to presyncopal episode  -no LOC, no postictal state, no diaphoresis  -several prior episodes; second time hospitalized for this issue  -Trops neg, EKG with trifascicular block pending repeat EKG  -pending repeat trops, repeat ekg; ep consult for abnormal ekg on admission    #medication management  -Patient doesn't know names but says they are all "life style" medications    #Dementia likely alzheimer's   -pt with intermittent Short term memory loss, worsening and severe per daughter  -pt does not follow up with PMD; Dr Waters  -per daughter was on namenda, not compliant  -CT head neg for acute pathology 76 year old female with no significant past medical hx presents with episode of near syncope:    #presyncope  -sudden onset generalized weakness, chest pressure, palpitations prior to presyncopal episode  -no LOC, no postictal state, no diaphoresis  -several prior episodes; second time hospitalized for this issue  -Trops neg, EKG with trifascicular block pending repeat EKG  -EP f/ placed loop recorder; f/u o/p  -echo:    #medication management  -Patient doesn't know names but says they are all "life style" medications    #Dementia likely alzheimer's   -pt with intermittent Short term memory loss, worsening and severe per daughter  -pt does not follow up with PMD; Dr Waters  -per daughter was on namenda, not compliant  -CT head neg for acute pathology    dvt ppx lovenox  diet dashtlc  gi ppx NI  activity aat

## 2022-01-21 NOTE — PROGRESS NOTE ADULT - SUBJECTIVE AND OBJECTIVE BOX
ALBANIA ARROYO  76y  Female      Patient is a 76y old  Female who presents with a chief complaint of near syncope (2022 09:14)      INTERVAL HPI/OVERNIGHT EVENTS:  She feels ok, no new complaints.   Vital Signs Last 24 Hrs  T(C): 35.6 (2022 14:10), Max: 36.6 (2022 20:32)  T(F): 96 (2022 14:10), Max: 97.9 (2022 20:32)  HR: 91 (2022 14:11) (71 - 91)  BP: 165/74 (2022 14:11) (138/62 - 174/81)  BP(mean): --  RR: 18 (2022 14:11) (18 - 18)  SpO2: 98% (:22) (97% - 98%)      22 @ 07:01  -  22 @ 07:00  --------------------------------------------------------  IN: 1510 mL / OUT: 0 mL / NET: 1510 mL            Consultant(s) Notes Reviewed:  [x ] YES  [ ] NO          MEDICATIONS  (STANDING):  enoxaparin Injectable 40 milliGRAM(s) SubCutaneous daily    MEDICATIONS  (PRN):  acetaminophen     Tablet .. 650 milliGRAM(s) Oral every 6 hours PRN Mild Pain (1 - 3)      LABS                          10.9   5.48  )-----------( 221      ( 2022 05:37 )             32.8         143  |  106  |  13  ----------------------------<  92  3.2<L>   |  24  |  0.7    Ca    9.0      2022 05:37  Mg     1.7         TPro  5.6<L>  /  Alb  3.8  /  TBili  0.4  /  DBili  x   /  AST  15  /  ALT  8   /  AlkPhos  74        Urinalysis Basic - ( 2022 22:00 )    Color: Yellow / Appearance: Clear / S.015 / pH: x  Gluc: x / Ketone: Negative  / Bili: Negative / Urobili: 0.2 mg/dL   Blood: x / Protein: Negative mg/dL / Nitrite: Negative   Leuk Esterase: Moderate / RBC: 1-2 /HPF / WBC 6-10 /HPF   Sq Epi: x / Non Sq Epi: Moderate /HPF / Bacteria: Many      PT/INR - ( 2022 17:20 )   PT: 12.30 sec;   INR: 1.07 ratio         PTT - ( 2022 17:20 )  PTT:32.7 sec  Lactate Trend    CARDIAC MARKERS ( 2022 19:22 )  x     / <0.01 ng/mL / x     / x     / x      CARDIAC MARKERS ( 2022 17:20 )  x     / <0.01 ng/mL / x     / x     / x          CAPILLARY BLOOD GLUCOSE            RADIOLOGY & ADDITIONAL TESTS:    Imaging Personally Reviewed:  [ ] YES  [ ] NO    HEALTH ISSUES - PROBLEM Dx:          PHYSICAL EXAM:  GENERAL: NAD, well-developed.  HEAD:  Atraumatic, Normocephalic.  EYES: EOMI, PERRLA, conjunctiva and sclera clear.  NECK: Supple, No JVD.  CHEST/LUNG: Clear to auscultation bilaterally; No wheeze.  HEART: Regular rate and rhythm; S1 S2.   ABDOMEN: Soft, Nontender, Nondistended; Bowel sounds present.  EXTREMITIES:  2+ Peripheral Pulses, No clubbing, cyanosis, or edema.  PSYCH: AAOx3.  NEUROLOGY: non-focal.  SKIN: No rashes or lesions.  
Electrophysiology Brief Post-Op Note    I have personally seen and examined the patient.  I agree with the history and physical which I have reviewed and noted any changes below.  01-20-22 @ 15:21    PRE-OP DIAGNOSIS:Syncope     POST-OP DIAGNOSIS: Syncope    PROCEDURE: Loop Implant    Physician: Dr Smalls  Assistant: CAROL ANN Jansen    ESTIMATED BLOOD LOSS:  2    mL    ANESTHESIA TYPE:  [  ]General Anesthesia  [  ] Sedation  [X  ] Local/Regional    CONDITION  [  ] Critical  [  ] Serious  [  ]Fair  [ X ]Good    SPECIMENS REMOVED (IF APPLICABLE):  none    IMPLANTS (IF APPLICABLE)  Loop Recorder ([employer])    FINDINGS  PLAN OF CARE  - follow up with EP 2/14/22 at 2:30 pm ([address] [facility])  - May remove bandaid tomorrow  - May shower in 24 hours                    
  ALBANIA ARROYO  76y  Female      Patient is a 76y old  Female who presents with a chief complaint of presyncope (2022 11:03)      INTERVAL HPI/OVERNIGHT EVENTS:  She feels ok, no dizziness or chest pain.   Vital Signs Last 24 Hrs  T(C): 36.4 (2022 04:44), Max: 36.6 (2022 19:50)  T(F): 97.6 (2022 04:44), Max: 97.8 (2022 19:50)  HR: 90 (2022 00:36) (62 - 90)  BP: 153/76 (2022 00:36) (124/68 - 153/76)  BP(mean): --  RR: 18 (2022 04:44) (17 - 18)  SpO2: 99% (2022 00:36) (97% - 99%)      22 @ 07:01  -  22 @ 07:00  --------------------------------------------------------  IN: 420 mL / OUT: 400 mL / NET: 20 mL            Consultant(s) Notes Reviewed:  [x ] YES  [ ] NO          MEDICATIONS  (STANDING):    MEDICATIONS  (PRN):      LABS                          12.7   8.36  )-----------( 261      ( 2022 17:20 )             38.0         143  |  105  |  20  ----------------------------<  100<H>  3.7   |  26  |  1.0    Ca    9.4      2022 17:20  Mg     1.7         TPro  6.6  /  Alb  4.5  /  TBili  0.4  /  DBili  x   /  AST  15  /  ALT  11  /  AlkPhos  94        Urinalysis Basic - ( 2022 22:00 )    Color: Yellow / Appearance: Clear / S.015 / pH: x  Gluc: x / Ketone: Negative  / Bili: Negative / Urobili: 0.2 mg/dL   Blood: x / Protein: Negative mg/dL / Nitrite: Negative   Leuk Esterase: Moderate / RBC: 1-2 /HPF / WBC 6-10 /HPF   Sq Epi: x / Non Sq Epi: Moderate /HPF / Bacteria: Many      PT/INR - ( 2022 17:20 )   PT: 12.30 sec;   INR: 1.07 ratio         PTT - ( 2022 17:20 )  PTT:32.7 sec  Lactate Trend    CARDIAC MARKERS ( 2022 17:20 )  x     / <0.01 ng/mL / x     / x     / x          CAPILLARY BLOOD GLUCOSE            RADIOLOGY & ADDITIONAL TESTS:    Imaging Personally Reviewed:  [ ] YES  [ ] NO    HEALTH ISSUES - PROBLEM Dx:          PHYSICAL EXAM:  GENERAL: NAD, well-developed.  HEAD:  Atraumatic, Normocephalic.  EYES: EOMI, PERRLA, conjunctiva and sclera clear.  NECK: Supple, No JVD.  CHEST/LUNG: Clear to auscultation bilaterally; No wheeze.  HEART: Regular rate and rhythm; S1 S2.   ABDOMEN: Soft, Nontender, Nondistended; Bowel sounds present.  EXTREMITIES:  2+ Peripheral Pulses, No clubbing, cyanosis, or edema.  PSYCH: AAOx3.  NEUROLOGY: non-focal.  SKIN: No rashes or lesions.
CHIEF COMPLAINT:  Patient is a 76y old  Female who presents with a chief complaint of     INTERVAL HISTORY/OVERNIGHT EVENTS:    -transfer from [facility], no acute events overnight  -no complaints this am, feels well, slightly confused at times    ======================  MEDICATIONS:  lactated ringers Bolus 1000 milliLiter(s) IV Bolus once    DRIPS:    PRN:       ======================  PHYSICAL EXAMINATION:  GEN:  nad.   HEENT:  eomi. ncat  PULM:  b/l bs.  clear.  no wheezing. no crackles or rales.   CARD: regular. s1, s2.  no murmurs.   ABD: +bs. ntnd  EXT:  no new rashes.  no pitting edema b/l .   NEURO:  follows directions, moves extremities  ======================  OBJECTIVE:        VS:  T(F): 97.6 ( @ 04:44), Max: 97.8 ( @ 19:50)  HR: 90 ( @ 00:36) (62 - 90)  BP: 153/76 ( @ 00:36) (124/68 - 153/76)  RR: 18 ( @ 04:44) (17 - 18)  SpO2: 99% ( @ 00:36) (97% - 99%)  CVP(mm Hg): --  CO: --  CI: --  PA: --  PCWP: --    I/O:       @ 07:01  -   @ 07:00  --------------------------------------------------------  IN: 420 mL / OUT: 400 mL / NET: 20 mL        Weight trend:  Weight (kg): 77.1 ()    ======================    LABS:                          12.7   8.36  )-----------( 261      ( 2022 17:20 )             38.0         143  |  105  |  20  ----------------------------<  100<H>  3.7   |  26  |  1.0    Ca    9.4      2022 17:20  Mg     1.7         TPro  6.6  /  Alb  4.5  /  TBili  0.4  /  DBili  x   /  AST  15  /  ALT  11  /  AlkPhos  94      LIVER FUNCTIONS - ( 2022 17:20 )  Alb: 4.5 g/dL / Pro: 6.6 g/dL / ALK PHOS: 94 U/L / ALT: 11 U/L / AST: 15 U/L / GGT: x           PT/INR - ( 2022 17:20 )   PT: 12.30 sec;   INR: 1.07 ratio         PTT - ( 2022 17:20 )  PTT:32.7 sec  Troponin T, Serum: <0.01 ng/mL ( @ 17:20)    CARDIAC MARKERS ( 2022 17:20 )  x     / <0.01 ng/mL / x     / x     / x            Urinalysis Basic - ( 2022 22:00 )    Color: Yellow / Appearance: Clear / S.015 / pH: x  Gluc: x / Ketone: Negative  / Bili: Negative / Urobili: 0.2 mg/dL   Blood: x / Protein: Negative mg/dL / Nitrite: Negative   Leuk Esterase: Moderate / RBC: 1-2 /HPF / WBC 6-10 /HPF   Sq Epi: x / Non Sq Epi: Moderate /HPF / Bacteria: Many        Cultures:        
CHIEF COMPLAINT:  Patient is a 76y old  Female who presents with a chief complaint of near syncope (2022 20:27)      INTERVAL HISTORY/OVERNIGHT EVENTS:    -no acute events overnight; at echo this AM  -mag/potassium low, repleted  -pending echo results; d/c home today      -transfer from [facility], no acute events overnight  -no complaints this am, feels well, slightly confused at times    ======================  MEDICATIONS:    DRIPS:    PRN:       ======================  PHYSICAL EXAMINATION:INCOMPLETE  GEN:  nad.   HEENT:  eomi. ncat  PULM:  b/l bs.  clear.  no wheezing. no crackles or rales.   CARD: regular. s1, s2.  no murmurs.   ABD: +bs. ntnd  EXT:  no new rashes.  no pitting edema b/l .   NEURO:  no new focal deficits.   ======================  OBJECTIVE:        VS:  T(F): 96.1 ( @ 04:37), Max: 97.9 ( @ 20:32)  HR: 71 ( @ 20:32) (71 - 71)  BP: 138/62 ( @ 20:32) (138/62 - 138/62)  RR: 18 ( @ 04:37) (18 - 18)  SpO2: 98% ( @ 20:22) (97% - 98%)  CVP(mm Hg): --  CO: --  CI: --  PA: --  PCWP: --    I/O:       @ 07:  -   @ 07:00  --------------------------------------------------------  IN: 420 mL / OUT: 400 mL / NET: 20 mL     @ 07:  -   @ 07:00  --------------------------------------------------------  IN: 1510 mL / OUT: 0 mL / NET: 1510 mL        Weight trend:  Weight (kg): 77.1 ()    ======================    LABS:                          10.9   5.48  )-----------( 221      ( 2022 05:37 )             32.8         143  |  106  |  13  ----------------------------<  92  3.2<L>   |  24  |  0.7    Ca    9.0      2022 05:37  Mg     1.7         TPro  5.6<L>  /  Alb  3.8  /  TBili  0.4  /  DBili  x   /  AST  15  /  ALT  8   /  AlkPhos  74      LIVER FUNCTIONS - ( 2022 05:37 )  Alb: 3.8 g/dL / Pro: 5.6 g/dL / ALK PHOS: 74 U/L / ALT: 8 U/L / AST: 15 U/L / GGT: x           PT/INR - ( 2022 17:20 )   PT: 12.30 sec;   INR: 1.07 ratio         PTT - ( 2022 17:20 )  PTT:32.7 sec  Troponin T, Serum: <0.01 ng/mL ( @ 19:22)    CARDIAC MARKERS ( 2022 19:22 )  x     / <0.01 ng/mL / x     / x     / x      CARDIAC MARKERS ( 2022 17:20 )  x     / <0.01 ng/mL / x     / x     / x            Urinalysis Basic - ( 2022 22:00 )    Color: Yellow / Appearance: Clear / S.015 / pH: x  Gluc: x / Ketone: Negative  / Bili: Negative / Urobili: 0.2 mg/dL   Blood: x / Protein: Negative mg/dL / Nitrite: Negative   Leuk Esterase: Moderate / RBC: 1-2 /HPF / WBC 6-10 /HPF   Sq Epi: x / Non Sq Epi: Moderate /HPF / Bacteria: Many        Cultures:

## 2022-01-21 NOTE — DISCHARGE NOTE NURSING/CASE MANAGEMENT/SOCIAL WORK - NSDCPEFALRISK_GEN_ALL_CORE
For information on Fall & Injury Prevention, visit: [email or web address] OR  [email or web address] OR  https://www.cdc.gov/steadi/patient.html

## 2022-01-22 LAB
CULTURE RESULTS: SIGNIFICANT CHANGE UP
SPECIMEN SOURCE: SIGNIFICANT CHANGE UP

## 2022-01-26 DIAGNOSIS — E61.2 MAGNESIUM DEFICIENCY: ICD-10-CM

## 2022-01-26 DIAGNOSIS — I34.0 NONRHEUMATIC MITRAL (VALVE) INSUFFICIENCY: ICD-10-CM

## 2022-01-26 DIAGNOSIS — I45.3 TRIFASCICULAR BLOCK: ICD-10-CM

## 2022-01-26 DIAGNOSIS — F02.80 DEMENTIA IN OTHER DISEASES CLASSIFIED ELSEWHERE, UNSPECIFIED SEVERITY, WITHOUT BEHAVIORAL DISTURBANCE, PSYCHOTIC DISTURBANCE, MOOD DISTURBANCE, AND ANXIETY: ICD-10-CM

## 2022-01-26 DIAGNOSIS — Z91.14 PATIENT'S OTHER NONCOMPLIANCE WITH MEDICATION REGIMEN: ICD-10-CM

## 2022-01-26 DIAGNOSIS — E86.0 DEHYDRATION: ICD-10-CM

## 2022-01-26 DIAGNOSIS — I95.1 ORTHOSTATIC HYPOTENSION: ICD-10-CM

## 2022-01-26 DIAGNOSIS — R55 SYNCOPE AND COLLAPSE: ICD-10-CM

## 2022-01-26 DIAGNOSIS — G30.9 ALZHEIMER'S DISEASE, UNSPECIFIED: ICD-10-CM

## 2022-03-07 ENCOUNTER — APPOINTMENT (OUTPATIENT)
Dept: CARDIOLOGY | Facility: CLINIC | Age: 77
End: 2022-03-07
Payer: MEDICARE

## 2022-03-07 VITALS
HEART RATE: 81 BPM | WEIGHT: 155 LBS | TEMPERATURE: 97.6 F | HEIGHT: 64 IN | SYSTOLIC BLOOD PRESSURE: 154 MMHG | BODY MASS INDEX: 26.46 KG/M2 | DIASTOLIC BLOOD PRESSURE: 87 MMHG

## 2022-03-07 DIAGNOSIS — I10 ESSENTIAL (PRIMARY) HYPERTENSION: ICD-10-CM

## 2022-03-07 DIAGNOSIS — I65.23 OCCLUSION AND STENOSIS OF BILATERAL CAROTID ARTERIES: ICD-10-CM

## 2022-03-07 DIAGNOSIS — I34.0 NONRHEUMATIC MITRAL (VALVE) INSUFFICIENCY: ICD-10-CM

## 2022-03-07 DIAGNOSIS — Z83.3 FAMILY HISTORY OF DIABETES MELLITUS: ICD-10-CM

## 2022-03-07 PROCEDURE — 99214 OFFICE O/P EST MOD 30 MIN: CPT | Mod: 24

## 2022-03-07 NOTE — HISTORY OF PRESENT ILLNESS
[FreeTextEntry1] : Referring Internist: Dr. Saleem Waters\par \par January 2022; ER after collapsing while in a supermarket. She tells me she became weak and started falling to the ground but was prevented from hitting fall by bystanders. CT head negative. Trops negative. Ortho \par statics positive in ED, received IV fluids. Repeat orthostatics morning after again positive. Will repeat orthostatics in one day. EKG showed; bifascicular block RBBB, LAFB and bordeline 1st degree AV block. EP was consulted and placed loop; Echo shows mod-severe mitral regurgitation. 60% EF.

## 2022-03-07 NOTE — DISCUSSION/SUMMARY
[FreeTextEntry1] : Ms. Mavis Lincoln is a pleasant 77 year-old woman with hypertension, carotid disease < 50%, syncope in January 2022, leading to ILR placement, chronic bifascicular block, and moderate to severe Mitral Regurgitation. \par \par I recommend BESSY to assess severity of mitral regurgitation. I will refer patient for BESSY and for general cardiology visit.\par \par small cervical lymph node left sided; no other palpable lymph nodes: recommend follow up with PCP\par \par I interrogated ILR: no arrhythmias, no pauses\par \par I interrogated and reprogrammed her device as described in procedure. Her wound is healed properly, with no signs of inflammation, infection or bleeding. I discussed with patient plan of care in great details. 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 Patient was pleased with the visit.  \par \par Patient will follow with me in 6 months’ time. Please do not hesitate to contact me at [phone] if you have any further questions regarding this patient care.\par \par

## 2022-03-07 NOTE — PHYSICAL EXAM
[Well Developed] : well developed [Well Nourished] : well nourished [No Acute Distress] : no acute distress [Normal Conjunctiva] : normal conjunctiva [Normal Venous Pressure] : normal venous pressure [No Carotid Bruit] : no carotid bruit [Normal S1, S2] : normal S1, S2 [No Murmur] : no murmur [No Rub] : no rub [No Gallop] : no gallop [Clear Lung Fields] : clear lung fields [Good Air Entry] : good air entry [No Respiratory Distress] : no respiratory distress  [Soft] : abdomen soft [Non Tender] : non-tender [No Masses/organomegaly] : no masses/organomegaly [Normal Bowel Sounds] : normal bowel sounds [Normal Gait] : normal gait [No Edema] : no edema [No Cyanosis] : no cyanosis [No Clubbing] : no clubbing [No Varicosities] : no varicosities [No Rash] : no rash [No Skin Lesions] : no skin lesions [Moves all extremities] : moves all extremities [No Focal Deficits] : no focal deficits [Normal Speech] : normal speech [Alert and Oriented] : alert and oriented [Normal memory] : normal memory [de-identified] : small 1x1 cm cervical lymph node palpable left side

## 2022-03-14 ENCOUNTER — FORM ENCOUNTER (OUTPATIENT)
Age: 77
End: 2022-03-14

## 2022-03-14 ENCOUNTER — NON-APPOINTMENT (OUTPATIENT)
Age: 77
End: 2022-03-14

## 2022-03-14 ENCOUNTER — APPOINTMENT (OUTPATIENT)
Dept: CARDIOLOGY | Facility: CLINIC | Age: 77
End: 2022-03-14
Payer: MEDICARE

## 2022-03-14 PROCEDURE — G2066: CPT

## 2022-03-14 PROCEDURE — 93298 REM INTERROG DEV EVAL SCRMS: CPT

## 2022-03-15 ENCOUNTER — OUTPATIENT (OUTPATIENT)
Dept: OUTPATIENT SERVICES | Facility: HOSPITAL | Age: 77
LOS: 1 days | Discharge: HOME | End: 2022-03-15
Payer: MEDICARE

## 2022-03-15 VITALS — HEIGHT: 64.02 IN | WEIGHT: 149.91 LBS | TEMPERATURE: 97 F

## 2022-03-15 DIAGNOSIS — I48.0 PAROXYSMAL ATRIAL FIBRILLATION: ICD-10-CM

## 2022-03-15 DIAGNOSIS — Z90.710 ACQUIRED ABSENCE OF BOTH CERVIX AND UTERUS: Chronic | ICD-10-CM

## 2022-03-15 LAB — SARS-COV-2 RNA SPEC QL NAA+PROBE: SIGNIFICANT CHANGE UP

## 2022-03-15 PROCEDURE — 93320 DOPPLER ECHO COMPLETE: CPT | Mod: 26

## 2022-03-15 PROCEDURE — 93312 ECHO TRANSESOPHAGEAL: CPT | Mod: 26,XU

## 2022-03-15 PROCEDURE — 93325 DOPPLER ECHO COLOR FLOW MAPG: CPT | Mod: 26

## 2022-03-15 NOTE — H&P CARDIOLOGY - HISTORY OF PRESENT ILLNESS
78 y/o F with PMH of recent hospitalization in Jan 2022 after near-syncopal event found to be orthostatic positive, HTN, bifascicular block and 1st AV block s/p loop recorder placement with no events on recent interrogation, also with mod-severe mitral regurgitation with preserved EF presented for transesophageal echocardiogram for evaluation of severity of mitral regurgitation

## 2022-03-15 NOTE — PRE-ANESTHESIA EVALUATION ADULT - NSANTHPMHFT_GEN_ALL_CORE
As per Cardiology H and P "77 F with PMH of recent hospitalization in Jan 2022 after near-syncopal event found to be orthostatic positive, HTN, bifascicular block and 1st AV block s/p loop recorder placement with no events on recent interrogation, also with mod-severe mitral regurgitation with preserved EF presented for transesophageal echocardiogram for evaluation of severity of mitral regurgitation"

## 2022-03-15 NOTE — ASU PATIENT PROFILE, ADULT - FALL HARM RISK - UNIVERSAL INTERVENTIONS
Bed in lowest position, wheels locked, appropriate side rails in place/Call bell, personal items and telephone in reach/Instruct patient to call for assistance before getting out of bed or chair/Non-slip footwear when patient is out of bed/[place] to call system/Physically safe environment - no spills, clutter or unnecessary equipment/Purposeful Proactive Rounding/Room/bathroom lighting operational, light cord in reach

## 2022-03-15 NOTE — CHART NOTE - NSCHARTNOTEFT_GEN_A_CORE
POST OPERATIVE PROCEDURAL DOCUMENTATION  PRE-OP DIAGNOSIS:  mod-sev MR      POST-OP DIAGNOSIS:  mild MR    PROCEDURE: Transesophageal echocardiogram    Primary Physician:      Assistant: Dr. Elaine    ANESTHESIA TYPE  [  ] General Anesthesia  [ x ] Conscious Sedation  [  ] Local/Regional    CONDITION  [  ] Critical  [  ] Serious  [  ] Fair  [ x ] Good    SPECIMENS REMOVED (IF APPLICABLE): N/A    IMPLANTS (IF APPLICABLE): None    ESTIMATED BLOOD LOSS: None    COMPLICATIONS: None      FINDINGS:    After risks and benefits of procedures were explained, informed consent was obtained and placed in chart. Refer to Anesthesia note for sedation details.  The BESSY probe was passed into the esophagus without difficulty.  Transesophageal and transgastric images were obtained.  The BESSY probe was removed without difficulty and examined.  There was no evidence for bleeding.  The patient tolerated the procedure well without any immediate BESSY-related complications.      Preliminary Findings:  LA:     LYLE: Left atrial appendage was clear of clot and smoke.  LV: LVEF   MV: mild MR, no evidence of MS.   AV: mod AI, no evidence of AS.   RA:  TV: nwv  PV: mild PI.       BESSY terminated prematurely due to concern for respiratory depression    DIAGNOSIS/IMPRESSION:  mild MR  MOD AI.     PLAN OF CARE:  dc home when stable  f/u with cardiologist POST OPERATIVE PROCEDURAL DOCUMENTATION  PRE-OP DIAGNOSIS:  mod-sev MR      POST-OP DIAGNOSIS:  mild MR    PROCEDURE: Transesophageal echocardiogram    Primary Physician:      Assistant: Dr. Elaine    ANESTHESIA TYPE  [  ] General Anesthesia  [ x ] Conscious Sedation  [  ] Local/Regional    CONDITION  [  ] Critical  [  ] Serious  [  ] Fair  [ x ] Good    SPECIMENS REMOVED (IF APPLICABLE): N/A    IMPLANTS (IF APPLICABLE): None    ESTIMATED BLOOD LOSS: None    COMPLICATIONS: None      FINDINGS:    After risks and benefits of procedures were explained, informed consent was obtained and placed in chart. Refer to Anesthesia note for sedation details.  The BESSY probe was passed into the esophagus without difficulty.  Transesophageal and transgastric images were obtained.  The BESSY probe was removed without difficulty and examined.  There was no evidence for bleeding.  The patient tolerated the procedure well without any immediate BESSY-related complications.      Preliminary Findings:    < from: Transesophageal Echocardiogram (03.15.22 @ 15:33) >     1. Left ventricular ejection fraction, by visual estimation, is 55 to   60%.   2. Mild mitral valve regurgitation.   3. Moderate aortic regurgitation.   4. Mild pulmonic valve regurgitation.   5. Mildly enlarged left atrium.   6. No left atrial appendage thrombus.   7. Dilatation of the aortic root and ascending aorta.   8. Aortic root measured at 3.6 cm. Ascending Aorta measured at 3.9 cm.   9. Normal global left ventricular systolic function.        < end of copied text >          BESSY terminated prematurely due to concern for respiratory depression    DIAGNOSIS/IMPRESSION:  mild MR  MOD AI.     PLAN OF CARE:  dc home when stable  f/u with cardiologist

## 2022-03-27 LAB
ALBUMIN SERPL ELPH-MCNC: 4.9 G/DL
ALP BLD-CCNC: 101 U/L
ALT SERPL-CCNC: 13 U/L
ANION GAP SERPL CALC-SCNC: 17 MMOL/L
APTT BLD: 36.7 SEC
AST SERPL-CCNC: 19 U/L
BASOPHILS # BLD AUTO: 0.03 K/UL
BASOPHILS NFR BLD AUTO: 0.4 %
BILIRUB SERPL-MCNC: 0.4 MG/DL
BUN SERPL-MCNC: 15 MG/DL
CALCIUM SERPL-MCNC: 10.5 MG/DL
CHLORIDE SERPL-SCNC: 103 MMOL/L
CO2 SERPL-SCNC: 25 MMOL/L
CREAT SERPL-MCNC: 1 MG/DL
EGFR: 58 ML/MIN/1.73M2
EOSINOPHIL # BLD AUTO: 0.13 K/UL
EOSINOPHIL NFR BLD AUTO: 1.6 %
GLUCOSE SERPL-MCNC: 105 MG/DL
HCT VFR BLD CALC: 41.9 %
HGB BLD-MCNC: 13.2 G/DL
IMM GRANULOCYTES NFR BLD AUTO: 0.2 %
INR PPP: 0.99 RATIO
LYMPHOCYTES # BLD AUTO: 2.79 K/UL
LYMPHOCYTES NFR BLD AUTO: 33.8 %
MAN DIFF?: NORMAL
MCHC RBC-ENTMCNC: 29.1 PG
MCHC RBC-ENTMCNC: 31.5 G/DL
MCV RBC AUTO: 92.5 FL
MONOCYTES # BLD AUTO: 0.45 K/UL
MONOCYTES NFR BLD AUTO: 5.5 %
NEUTROPHILS # BLD AUTO: 4.83 K/UL
NEUTROPHILS NFR BLD AUTO: 58.5 %
PLATELET # BLD AUTO: 298 K/UL
POTASSIUM SERPL-SCNC: 4.4 MMOL/L
PROT SERPL-MCNC: 7 G/DL
PT BLD: 11.4 SEC
RBC # BLD: 4.53 M/UL
RBC # FLD: 13.2 %
SODIUM SERPL-SCNC: 145 MMOL/L
TSH SERPL-ACNC: 1.75 UIU/ML
WBC # FLD AUTO: 8.25 K/UL

## 2022-04-18 ENCOUNTER — NON-APPOINTMENT (OUTPATIENT)
Age: 77
End: 2022-04-18

## 2022-04-18 ENCOUNTER — APPOINTMENT (OUTPATIENT)
Dept: CARDIOLOGY | Facility: CLINIC | Age: 77
End: 2022-04-18
Payer: MEDICARE

## 2022-04-18 PROBLEM — I34.0 NONRHEUMATIC MITRAL (VALVE) INSUFFICIENCY: Chronic | Status: ACTIVE | Noted: 2022-03-15

## 2022-04-18 PROBLEM — Z86.79 PERSONAL HISTORY OF OTHER DISEASES OF THE CIRCULATORY SYSTEM: Chronic | Status: ACTIVE | Noted: 2022-03-15

## 2022-04-18 PROCEDURE — 93298 REM INTERROG DEV EVAL SCRMS: CPT

## 2022-04-18 PROCEDURE — G2066: CPT

## 2022-05-23 ENCOUNTER — NON-APPOINTMENT (OUTPATIENT)
Age: 77
End: 2022-05-23

## 2022-05-23 ENCOUNTER — APPOINTMENT (OUTPATIENT)
Dept: CARDIOLOGY | Facility: CLINIC | Age: 77
End: 2022-05-23
Payer: MEDICARE

## 2022-05-23 PROCEDURE — 93298 REM INTERROG DEV EVAL SCRMS: CPT

## 2022-05-23 PROCEDURE — G2066: CPT

## 2022-06-27 ENCOUNTER — NON-APPOINTMENT (OUTPATIENT)
Age: 77
End: 2022-06-27

## 2022-06-27 ENCOUNTER — APPOINTMENT (OUTPATIENT)
Dept: CARDIOLOGY | Facility: CLINIC | Age: 77
End: 2022-06-27
Payer: MEDICARE

## 2022-06-27 PROCEDURE — G2066: CPT

## 2022-06-27 PROCEDURE — 93298 REM INTERROG DEV EVAL SCRMS: CPT

## 2022-06-30 NOTE — PATIENT PROFILE ADULT - DOMESTIC TRAVEL HIGH RISK QUESTION
Likely viral syndrome. Will give meds, PO trial, reassess. Do not expect acute abd. Dispo pending. Likely viral syndrome. Will give meds, PO trial, reassess. Do not suspect acute abd given exam.  Will reassess post meds. No

## 2022-08-01 ENCOUNTER — APPOINTMENT (OUTPATIENT)
Dept: CARDIOLOGY | Facility: CLINIC | Age: 77
End: 2022-08-01

## 2022-08-01 ENCOUNTER — NON-APPOINTMENT (OUTPATIENT)
Age: 77
End: 2022-08-01

## 2022-08-01 PROCEDURE — G2066: CPT

## 2022-08-01 PROCEDURE — 93298 REM INTERROG DEV EVAL SCRMS: CPT

## 2022-08-10 ENCOUNTER — APPOINTMENT (OUTPATIENT)
Dept: NEUROLOGY | Facility: CLINIC | Age: 77
End: 2022-08-10

## 2022-08-27 ENCOUNTER — EMERGENCY (EMERGENCY)
Facility: HOSPITAL | Age: 77
LOS: 0 days | Discharge: HOME | End: 2022-08-28
Attending: STUDENT IN AN ORGANIZED HEALTH CARE EDUCATION/TRAINING PROGRAM | Admitting: EMERGENCY MEDICINE

## 2022-08-27 VITALS
HEIGHT: 64 IN | WEIGHT: 148.15 LBS | OXYGEN SATURATION: 97 % | HEART RATE: 107 BPM | RESPIRATION RATE: 18 BRPM | DIASTOLIC BLOOD PRESSURE: 77 MMHG | SYSTOLIC BLOOD PRESSURE: 139 MMHG | TEMPERATURE: 99 F

## 2022-08-27 DIAGNOSIS — Z83.3 FAMILY HISTORY OF DIABETES MELLITUS: ICD-10-CM

## 2022-08-27 DIAGNOSIS — G30.9 ALZHEIMER'S DISEASE, UNSPECIFIED: ICD-10-CM

## 2022-08-27 DIAGNOSIS — R07.9 CHEST PAIN, UNSPECIFIED: ICD-10-CM

## 2022-08-27 DIAGNOSIS — Z90.710 ACQUIRED ABSENCE OF BOTH CERVIX AND UTERUS: Chronic | ICD-10-CM

## 2022-08-27 DIAGNOSIS — I34.0 NONRHEUMATIC MITRAL (VALVE) INSUFFICIENCY: ICD-10-CM

## 2022-08-27 DIAGNOSIS — Z20.822 CONTACT WITH AND (SUSPECTED) EXPOSURE TO COVID-19: ICD-10-CM

## 2022-08-27 DIAGNOSIS — I10 ESSENTIAL (PRIMARY) HYPERTENSION: ICD-10-CM

## 2022-08-27 DIAGNOSIS — R06.02 SHORTNESS OF BREATH: ICD-10-CM

## 2022-08-27 DIAGNOSIS — Z87.891 PERSONAL HISTORY OF NICOTINE DEPENDENCE: ICD-10-CM

## 2022-08-27 DIAGNOSIS — F02.80 DEMENTIA IN OTHER DISEASES CLASSIFIED ELSEWHERE, UNSPECIFIED SEVERITY, WITHOUT BEHAVIORAL DISTURBANCE, PSYCHOTIC DISTURBANCE, MOOD DISTURBANCE, AND ANXIETY: ICD-10-CM

## 2022-08-27 LAB
ALBUMIN SERPL ELPH-MCNC: 4.2 G/DL — SIGNIFICANT CHANGE UP (ref 3.5–5.2)
ALP SERPL-CCNC: 98 U/L — SIGNIFICANT CHANGE UP (ref 30–115)
ALT FLD-CCNC: 12 U/L — SIGNIFICANT CHANGE UP (ref 0–41)
ANION GAP SERPL CALC-SCNC: 10 MMOL/L — SIGNIFICANT CHANGE UP (ref 7–14)
AST SERPL-CCNC: 18 U/L — SIGNIFICANT CHANGE UP (ref 0–41)
BASOPHILS # BLD AUTO: 0.03 K/UL — SIGNIFICANT CHANGE UP (ref 0–0.2)
BASOPHILS NFR BLD AUTO: 0.4 % — SIGNIFICANT CHANGE UP (ref 0–1)
BILIRUB SERPL-MCNC: 0.4 MG/DL — SIGNIFICANT CHANGE UP (ref 0.2–1.2)
BUN SERPL-MCNC: 36 MG/DL — HIGH (ref 10–20)
CALCIUM SERPL-MCNC: 9.5 MG/DL — SIGNIFICANT CHANGE UP (ref 8.5–10.1)
CHLORIDE SERPL-SCNC: 101 MMOL/L — SIGNIFICANT CHANGE UP (ref 98–110)
CO2 SERPL-SCNC: 31 MMOL/L — SIGNIFICANT CHANGE UP (ref 17–32)
CREAT SERPL-MCNC: 1.3 MG/DL — SIGNIFICANT CHANGE UP (ref 0.7–1.5)
EGFR: 42 ML/MIN/1.73M2 — LOW
EOSINOPHIL # BLD AUTO: 0.12 K/UL — SIGNIFICANT CHANGE UP (ref 0–0.7)
EOSINOPHIL NFR BLD AUTO: 1.8 % — SIGNIFICANT CHANGE UP (ref 0–8)
FLUAV AG NPH QL: SIGNIFICANT CHANGE UP
FLUBV AG NPH QL: SIGNIFICANT CHANGE UP
GLUCOSE SERPL-MCNC: 88 MG/DL — SIGNIFICANT CHANGE UP (ref 70–99)
HCT VFR BLD CALC: 38.7 % — SIGNIFICANT CHANGE UP (ref 37–47)
HGB BLD-MCNC: 13 G/DL — SIGNIFICANT CHANGE UP (ref 12–16)
IMM GRANULOCYTES NFR BLD AUTO: 0.3 % — SIGNIFICANT CHANGE UP (ref 0.1–0.3)
LYMPHOCYTES # BLD AUTO: 1.75 K/UL — SIGNIFICANT CHANGE UP (ref 1.2–3.4)
LYMPHOCYTES # BLD AUTO: 25.6 % — SIGNIFICANT CHANGE UP (ref 20.5–51.1)
MAGNESIUM SERPL-MCNC: 2 MG/DL — SIGNIFICANT CHANGE UP (ref 1.8–2.4)
MCHC RBC-ENTMCNC: 28.4 PG — SIGNIFICANT CHANGE UP (ref 27–31)
MCHC RBC-ENTMCNC: 33.6 G/DL — SIGNIFICANT CHANGE UP (ref 32–37)
MCV RBC AUTO: 84.7 FL — SIGNIFICANT CHANGE UP (ref 81–99)
MONOCYTES # BLD AUTO: 0.49 K/UL — SIGNIFICANT CHANGE UP (ref 0.1–0.6)
MONOCYTES NFR BLD AUTO: 7.2 % — SIGNIFICANT CHANGE UP (ref 1.7–9.3)
NEUTROPHILS # BLD AUTO: 4.43 K/UL — SIGNIFICANT CHANGE UP (ref 1.4–6.5)
NEUTROPHILS NFR BLD AUTO: 64.7 % — SIGNIFICANT CHANGE UP (ref 42.2–75.2)
NRBC # BLD: 0 /100 WBCS — SIGNIFICANT CHANGE UP (ref 0–0)
NT-PROBNP SERPL-SCNC: 26 PG/ML — SIGNIFICANT CHANGE UP (ref 0–300)
PLATELET # BLD AUTO: 266 K/UL — SIGNIFICANT CHANGE UP (ref 130–400)
POTASSIUM SERPL-MCNC: 3.6 MMOL/L — SIGNIFICANT CHANGE UP (ref 3.5–5)
POTASSIUM SERPL-SCNC: 3.6 MMOL/L — SIGNIFICANT CHANGE UP (ref 3.5–5)
PROT SERPL-MCNC: 6.2 G/DL — SIGNIFICANT CHANGE UP (ref 6–8)
RBC # BLD: 4.57 M/UL — SIGNIFICANT CHANGE UP (ref 4.2–5.4)
RBC # FLD: 12.8 % — SIGNIFICANT CHANGE UP (ref 11.5–14.5)
RSV RNA NPH QL NAA+NON-PROBE: SIGNIFICANT CHANGE UP
SARS-COV-2 RNA SPEC QL NAA+PROBE: SIGNIFICANT CHANGE UP
SODIUM SERPL-SCNC: 142 MMOL/L — SIGNIFICANT CHANGE UP (ref 135–146)
TROPONIN T SERPL-MCNC: <0.01 NG/ML — SIGNIFICANT CHANGE UP
TROPONIN T SERPL-MCNC: <0.01 NG/ML — SIGNIFICANT CHANGE UP
WBC # BLD: 6.84 K/UL — SIGNIFICANT CHANGE UP (ref 4.8–10.8)
WBC # FLD AUTO: 6.84 K/UL — SIGNIFICANT CHANGE UP (ref 4.8–10.8)

## 2022-08-27 PROCEDURE — 93010 ELECTROCARDIOGRAM REPORT: CPT

## 2022-08-27 PROCEDURE — 71045 X-RAY EXAM CHEST 1 VIEW: CPT | Mod: 26

## 2022-08-27 PROCEDURE — 99220: CPT

## 2022-08-27 RX ORDER — HALOPERIDOL DECANOATE 100 MG/ML
5 INJECTION INTRAMUSCULAR ONCE
Refills: 0 | Status: COMPLETED | OUTPATIENT
Start: 2022-08-27 | End: 2022-08-27

## 2022-08-27 RX ADMIN — HALOPERIDOL DECANOATE 5 MILLIGRAM(S): 100 INJECTION INTRAMUSCULAR at 23:11

## 2022-08-27 NOTE — ED PROVIDER NOTE - OBJECTIVE STATEMENT
77-year-old female past medical history of Alzheimer's dementia, hypertension, hyperlipidemia brought to ED by daughter for 1 week of intermittent shortness of breath and chest pain.  Patient reports chest pressure that happens denies with exertion and dizziness at rest even in the morning when she wakes up.  Denies syncope, fatigue, lightheadedness.  Patient is a poor historian secondary to Alzheimer's disease but parts of her history are collaborated by her daughter.  Daughter reports that patient has not been compliant with medications but has restarted them in the past few days.  Patient denies orthopnea and leg swelling.

## 2022-08-27 NOTE — ED CDU PROVIDER INITIAL DAY NOTE - OBJECTIVE STATEMENT
77 y.o female with pmx of alzheimer's/dementia , htn, hld comes to ed complain of few months of intermittent chest pain but states for the past 1 week has gotten worse. as per patient with daughter at bedside states saw cardiologist dr. lewis few months ago. state definitely had stress done but cannot recall date.

## 2022-08-27 NOTE — ED PROVIDER NOTE - IV ALTEPLASE EXCL ABS HIDDEN
DISCHARGE PLANNING - CARE MANAGEMENT     Discharge to post-acute care or home with appropriate resources Progressing        Potential for Falls     Patient will remain free of falls Progressing
Problem: DISCHARGE PLANNING - CARE MANAGEMENT  Goal: Discharge to post-acute care or home with appropriate resources  INTERVENTIONS:  - Conduct assessment to determine patient/family and health care team treatment goals, and need for post-acute services based on payer coverage, community resources, and patient preferences, and barriers to discharge  - Address psychosocial, clinical, and financial barriers to discharge as identified in assessment in conjunction with the patient/family and health care team  - Arrange appropriate level of post-acute services according to patient's   needs and preference and payer coverage in collaboration with the physician and health care team  - Communicate with and update the patient/family, physician, and health care team regarding progress on the discharge plan  - Arrange appropriate transportation to post-acute venues  Outcome: Progressing
Problem: Potential for Falls  Goal: Patient will remain free of falls  INTERVENTIONS:  - Assess patient frequently for physical needs  -  Identify cognitive and physical deficits and behaviors that affect risk of falls    -  [place] fall precautions as indicated by assessment   - Educate patient/family on patient safety including physical limitations  - Instruct patient to call for assistance with activity based on assessment  - Modify environment to reduce risk of injury  - Consider OT/PT consult to assist with strengthening/mobility   Outcome: Progressing
show

## 2022-08-27 NOTE — ED PROVIDER NOTE - ATTENDING CONTRIBUTION TO CARE
Patient presents with a 1 week history of intermittent shortness of breath and chest pain described as chest pressure sometimes happens with exertion but sometimes at rest even in the morning when she wakes up.  She denies any syncopal events or lightheadedness.  Patient however is a poor historian secondary to her Alzheimer's but some parts of the history are rather collaborated by the daughter.  Daughter reports mixup with medications but has restarted her medicines for the past 3 days.  Patient denies any orthopnea or leg swelling.  On exam lungs are clear heart is normal extremities are normal plan is to obtain cardiac evaluation chest x-ray EKG and cardiac enzymes

## 2022-08-27 NOTE — ED PROVIDER NOTE - PRIOR HOSPITAL/ED VISITS SUMMARY FREE TEXT FOR MDM OBTAINED AND REVIEWED OLD RECORDS QUESTION
notes reviewed January 2022 for near syncope with a loop recorder and echo obtained.  Echo obtained showed moderate to severe mitral regurg loop recorder which was interrogated March 15 was negative for any arrhythmias.

## 2022-08-27 NOTE — ED ADULT NURSE NOTE - OBJECTIVE STATEMENT
pt reports sob for " a while " but increasing over the last week feeling like " shes going to die " pt unsure of medications she takes. Pt states " lopez been on these pills for awhile but I don't know what theyre for I just take them " pt reports decrease in memory over last year as well

## 2022-08-27 NOTE — ED PROVIDER NOTE - PHYSICAL EXAMINATION
VITAL SIGNS: I have reviewed nursing notes and confirm.  CONSTITUTIONAL: pleasant elderly female, non-toxic, NAD  SKIN: Warm dry, normal skin turgor  HEAD: NCAT  EYES: EOMI, no scleral icterus  ENT: Moist mucous membranes, normal pharynx with no erythema or exudates  NECK: Supple; non tender. Full ROM. No cervical LAD  CARD: RRR, no murmurs, rubs or gallops  RESP: clear to ausculation b/l.  No rales, rhonchi, or wheezing. No tachypnea.  ABD: soft, non-tender, non-distended, no rebound or guarding.  EXT: Full ROM, no bony tenderness, no pedal edema, no calf tenderness.  NEURO: Awake, alert, oriented. Normal gait.  PSYCH: Cooperative, appropriate. VITAL SIGNS: I have reviewed nursing notes and confirm.  CONSTITUTIONAL: pleasant elderly female, non-toxic, NAD  SKIN: Warm dry, normal skin turgor  HEAD: NCAT  EYES: EOMI, no scleral icterus  ENT: Moist mucous membranes, normal pharynx with no erythema or exudates  NECK: Supple; non tender. Full ROM. No cervical LAD  CARD: RRR, no murmurs, rubs or gallops  RESP: clear to ausculation b/l.  No rales, rhonchi, or wheezing. No tachypnea.  ABD: soft, non-tender, non-distended, no rebound or guarding.  EXT: Full ROM, no bony tenderness, no pedal edema, no calf tenderness.  NEURO: Awake, alert, oriented to name only, does not know place or month/year. No focal deficits.  PSYCH: Cooperative, appropriate.

## 2022-08-27 NOTE — ED CDU PROVIDER INITIAL DAY NOTE - MEDICAL DECISION MAKING DETAILS
patient evaluated for chest pain and sob. has prior work up for near syncope with loop recorder and echo. patient to be placed in obs for further risk stratification.

## 2022-08-27 NOTE — ED CDU PROVIDER INITIAL DAY NOTE - NSICDXFAMILYHX_GEN_ALL_CORE_FT
FAMILY HISTORY:  Family history unknown    Father  Still living? No  Family history of diabetes mellitus (DM), Age at diagnosis: Age Unknown    Mother  Still living? No  Family history of diabetes mellitus (DM), Age at diagnosis: Age Unknown

## 2022-08-27 NOTE — ED CDU PROVIDER INITIAL DAY NOTE - NS ED ATTENDING STATEMENT MOD
This was a shared visit with the DARIEL. I reviewed and verified the documentation and independently performed the documented:

## 2022-08-27 NOTE — ED PROVIDER NOTE - CLINICAL SUMMARY MEDICAL DECISION MAKING FREE TEXT BOX
patient evaluated for sob and chest pain, cardiac enzymes and ekg negative. given her age and risk factors patient placed in obs for further risk stratification

## 2022-08-27 NOTE — ED ADULT NURSE REASSESSMENT NOTE - NS ED NURSE REASSESS COMMENT FT1
Change of shift given by previous nurse. Pt is A&Ox2-3 came for SOB and CP for 1 week. Pt is placed on obs for CP and on cardiac monitor. PT denies CP or SOB and is on room air. Bed alarm on and call bell within reach. Comfort measure in place. Will continue to monitor pt .

## 2022-08-28 VITALS
HEART RATE: 82 BPM | SYSTOLIC BLOOD PRESSURE: 124 MMHG | OXYGEN SATURATION: 98 % | TEMPERATURE: 97 F | RESPIRATION RATE: 18 BRPM | DIASTOLIC BLOOD PRESSURE: 57 MMHG

## 2022-08-28 PROCEDURE — 78452 HT MUSCLE IMAGE SPECT MULT: CPT | Mod: 26,MG

## 2022-08-28 PROCEDURE — G1004: CPT

## 2022-08-28 PROCEDURE — 99217: CPT

## 2022-08-28 PROCEDURE — 93018 CV STRESS TEST I&R ONLY: CPT

## 2022-08-28 PROCEDURE — 93016 CV STRESS TEST SUPVJ ONLY: CPT

## 2022-08-28 RX ORDER — REGADENOSON 0.08 MG/ML
0.4 INJECTION, SOLUTION INTRAVENOUS ONCE
Refills: 0 | Status: DISCONTINUED | OUTPATIENT
Start: 2022-08-28 | End: 2022-08-27

## 2022-08-28 NOTE — ED CDU PROVIDER DISPOSITION NOTE - PATIENT PORTAL LINK FT
You can access the FollowMyHealth Patient Portal offered by [facility] by registering at the following website: [email or web address] By joining [facility]’s FollowMyHealth portal, you will also be able to view your health information using other applications (apps) compatible with our system.

## 2022-08-28 NOTE — ED CDU PROVIDER DISPOSITION NOTE - NSFOLLOWUPINSTRUCTIONS_ED_ALL_ED_FT

## 2022-08-28 NOTE — ED CDU PROVIDER DISPOSITION NOTE - CARE PROVIDERS DIRECT ADDRESSES
[email or web address]

## 2022-08-28 NOTE — ED CDU PROVIDER DISPOSITION NOTE - CARE PROVIDER_API CALL
Saleem Waters (DO)  Family Medicine  [address] [place] [address]  Phone: [phone]  Fax: [phone]  Follow Up Time:

## 2022-08-28 NOTE — ED CDU PROVIDER SUBSEQUENT DAY NOTE - HISTORY
pt here for CP X 1 month worsened over past 1 week, unclear cards workup recently, will obtain pharm nuc

## 2022-08-28 NOTE — ED CDU PROVIDER DISPOSITION NOTE - CLINICAL COURSE
76 y/o F w/ cp. Pain resolved. NO acute OBS events. Trop neg. CXR clear. EKG non ischemic. Nuc stress NL. Pt stable for dc w/ outpt f/up, and care as discussed.  All results given to pt. dc home.

## 2022-08-28 NOTE — ED CDU PROVIDER SUBSEQUENT DAY NOTE - PHYSICAL EXAMINATION
VITAL SIGNS: I have reviewed nursing notes and confirm.  CONSTITUTIONAL: pleasant elderly female, non-toxic, NAD  SKIN: Warm dry, normal skin turgor  HEAD: NCAT  EYES: EOMI, no scleral icterus  ENT: Moist mucous membranes, normal pharynx with no erythema or exudates  NECK: Supple; non tender. Full ROM. No cervical LAD  CARD: RRR, no murmurs, rubs or gallops  RESP: clear to ausculation b/l.  No rales, rhonchi, or wheezing. No tachypnea.  ABD: soft, non-tender, non-distended, no rebound or guarding.  EXT: Full ROM, no bony tenderness, no pedal edema, no calf tenderness.  NEURO: Awake, alert, oriented to name only, does not know place or month/year. No focal deficits.  PSYCH: Cooperative, appropriate.

## 2022-09-02 ENCOUNTER — APPOINTMENT (OUTPATIENT)
Dept: CARDIOLOGY | Facility: CLINIC | Age: 77
End: 2022-09-02

## 2022-09-02 ENCOUNTER — NON-APPOINTMENT (OUTPATIENT)
Age: 77
End: 2022-09-02

## 2022-09-02 PROCEDURE — 93298 REM INTERROG DEV EVAL SCRMS: CPT

## 2022-09-02 PROCEDURE — G2066: CPT

## 2022-09-13 ENCOUNTER — INPATIENT (INPATIENT)
Facility: HOSPITAL | Age: 77
LOS: 1 days | Discharge: HOME HEALTH | End: 2022-09-15
Attending: STUDENT IN AN ORGANIZED HEALTH CARE EDUCATION/TRAINING PROGRAM | Admitting: STUDENT IN AN ORGANIZED HEALTH CARE EDUCATION/TRAINING PROGRAM

## 2022-09-13 ENCOUNTER — NON-APPOINTMENT (OUTPATIENT)
Age: 77
End: 2022-09-13

## 2022-09-13 VITALS
DIASTOLIC BLOOD PRESSURE: 67 MMHG | RESPIRATION RATE: 17 BRPM | HEART RATE: 77 BPM | SYSTOLIC BLOOD PRESSURE: 141 MMHG | OXYGEN SATURATION: 95 % | HEIGHT: 64 IN | TEMPERATURE: 98 F | WEIGHT: 144.4 LBS

## 2022-09-13 DIAGNOSIS — F03.90 UNSPECIFIED DEMENTIA, UNSPECIFIED SEVERITY, WITHOUT BEHAVIORAL DISTURBANCE, PSYCHOTIC DISTURBANCE, MOOD DISTURBANCE, AND ANXIETY: ICD-10-CM

## 2022-09-13 DIAGNOSIS — Z90.710 ACQUIRED ABSENCE OF BOTH CERVIX AND UTERUS: Chronic | ICD-10-CM

## 2022-09-13 LAB
ALBUMIN SERPL ELPH-MCNC: 4.3 G/DL — SIGNIFICANT CHANGE UP (ref 3.5–5.2)
ALP SERPL-CCNC: 86 U/L — SIGNIFICANT CHANGE UP (ref 30–115)
ALT FLD-CCNC: 11 U/L — SIGNIFICANT CHANGE UP (ref 0–41)
ANION GAP SERPL CALC-SCNC: 10 MMOL/L — SIGNIFICANT CHANGE UP (ref 7–14)
APTT BLD: 24.5 SEC — LOW (ref 27–39.2)
AST SERPL-CCNC: 20 U/L — SIGNIFICANT CHANGE UP (ref 0–41)
BASOPHILS # BLD AUTO: 0.05 K/UL — SIGNIFICANT CHANGE UP (ref 0–0.2)
BASOPHILS NFR BLD AUTO: 0.7 % — SIGNIFICANT CHANGE UP (ref 0–1)
BILIRUB SERPL-MCNC: 0.4 MG/DL — SIGNIFICANT CHANGE UP (ref 0.2–1.2)
BLD GP AB SCN SERPL QL: SIGNIFICANT CHANGE UP
BUN SERPL-MCNC: 20 MG/DL — SIGNIFICANT CHANGE UP (ref 10–20)
CALCIUM SERPL-MCNC: 9.8 MG/DL — SIGNIFICANT CHANGE UP (ref 8.4–10.4)
CHLORIDE SERPL-SCNC: 95 MMOL/L — LOW (ref 98–110)
CO2 SERPL-SCNC: 32 MMOL/L — SIGNIFICANT CHANGE UP (ref 17–32)
CREAT SERPL-MCNC: 1 MG/DL — SIGNIFICANT CHANGE UP (ref 0.7–1.5)
EGFR: 58 ML/MIN/1.73M2 — LOW
EOSINOPHIL # BLD AUTO: 0.12 K/UL — SIGNIFICANT CHANGE UP (ref 0–0.7)
EOSINOPHIL NFR BLD AUTO: 1.8 % — SIGNIFICANT CHANGE UP (ref 0–8)
GLUCOSE SERPL-MCNC: 101 MG/DL — HIGH (ref 70–99)
HCT VFR BLD CALC: 38.9 % — SIGNIFICANT CHANGE UP (ref 37–47)
HGB BLD-MCNC: 13.4 G/DL — SIGNIFICANT CHANGE UP (ref 12–16)
IMM GRANULOCYTES NFR BLD AUTO: 0.3 % — SIGNIFICANT CHANGE UP (ref 0.1–0.3)
INR BLD: 0.98 RATIO — SIGNIFICANT CHANGE UP (ref 0.65–1.3)
LYMPHOCYTES # BLD AUTO: 1.82 K/UL — SIGNIFICANT CHANGE UP (ref 1.2–3.4)
LYMPHOCYTES # BLD AUTO: 27.2 % — SIGNIFICANT CHANGE UP (ref 20.5–51.1)
MCHC RBC-ENTMCNC: 29.1 PG — SIGNIFICANT CHANGE UP (ref 27–31)
MCHC RBC-ENTMCNC: 34.4 G/DL — SIGNIFICANT CHANGE UP (ref 32–37)
MCV RBC AUTO: 84.4 FL — SIGNIFICANT CHANGE UP (ref 81–99)
MONOCYTES # BLD AUTO: 0.42 K/UL — SIGNIFICANT CHANGE UP (ref 0.1–0.6)
MONOCYTES NFR BLD AUTO: 6.3 % — SIGNIFICANT CHANGE UP (ref 1.7–9.3)
NEUTROPHILS # BLD AUTO: 4.25 K/UL — SIGNIFICANT CHANGE UP (ref 1.4–6.5)
NEUTROPHILS NFR BLD AUTO: 63.7 % — SIGNIFICANT CHANGE UP (ref 42.2–75.2)
NRBC # BLD: 0 /100 WBCS — SIGNIFICANT CHANGE UP (ref 0–0)
NT-PROBNP SERPL-SCNC: 32 PG/ML — SIGNIFICANT CHANGE UP (ref 0–300)
PLATELET # BLD AUTO: 263 K/UL — SIGNIFICANT CHANGE UP (ref 130–400)
POTASSIUM SERPL-MCNC: 4 MMOL/L — SIGNIFICANT CHANGE UP (ref 3.5–5)
POTASSIUM SERPL-SCNC: 4 MMOL/L — SIGNIFICANT CHANGE UP (ref 3.5–5)
PROT SERPL-MCNC: 6.4 G/DL — SIGNIFICANT CHANGE UP (ref 6–8)
PROTHROM AB SERPL-ACNC: 11.3 SEC — SIGNIFICANT CHANGE UP (ref 9.95–12.87)
RBC # BLD: 4.61 M/UL — SIGNIFICANT CHANGE UP (ref 4.2–5.4)
RBC # FLD: 12.6 % — SIGNIFICANT CHANGE UP (ref 11.5–14.5)
SARS-COV-2 RNA SPEC QL NAA+PROBE: SIGNIFICANT CHANGE UP
SODIUM SERPL-SCNC: 137 MMOL/L — SIGNIFICANT CHANGE UP (ref 135–146)
TROPONIN T SERPL-MCNC: <0.01 NG/ML — SIGNIFICANT CHANGE UP
WBC # BLD: 6.68 K/UL — SIGNIFICANT CHANGE UP (ref 4.8–10.8)
WBC # FLD AUTO: 6.68 K/UL — SIGNIFICANT CHANGE UP (ref 4.8–10.8)

## 2022-09-13 PROCEDURE — 93291 INTERROG DEV EVAL SCRMS IP: CPT | Mod: 26

## 2022-09-13 PROCEDURE — 71046 X-RAY EXAM CHEST 2 VIEWS: CPT | Mod: 26

## 2022-09-13 PROCEDURE — 93010 ELECTROCARDIOGRAM REPORT: CPT

## 2022-09-13 PROCEDURE — 99223 1ST HOSP IP/OBS HIGH 75: CPT | Mod: 57

## 2022-09-13 PROCEDURE — 99285 EMERGENCY DEPT VISIT HI MDM: CPT

## 2022-09-13 RX ORDER — ROSUVASTATIN CALCIUM 5 MG/1
1 TABLET ORAL
Qty: 0 | Refills: 0 | DISCHARGE

## 2022-09-13 RX ORDER — AMLODIPINE BESYLATE 2.5 MG/1
1 TABLET ORAL
Qty: 0 | Refills: 0 | DISCHARGE

## 2022-09-13 RX ORDER — SERTRALINE 25 MG/1
1 TABLET, FILM COATED ORAL
Qty: 0 | Refills: 0 | DISCHARGE

## 2022-09-13 NOTE — ED PROVIDER NOTE - CLINICAL SUMMARY MEDICAL DECISION MAKING FREE TEXT BOX
Labs reviewed, EKG nsr with RBBB. Pt admitted to [facility] for EP eval, PPM placement

## 2022-09-13 NOTE — H&P ADULT - TIME BILLING
Bedside evaluation and exam  Review of prior data  Care coordination with EP  Post-procedure evalaution

## 2022-09-13 NOTE — ED PROVIDER NOTE - ATTENDING APP SHARED VISIT CONTRIBUTION OF CARE
78 y/o female with h/o dementia, htn, hld in ER for admission for PPM placement.  Pt states she had a loop recorder placed earlier this year as part of a w/u for syncope, following with Dr. Slim ZHOU.  Was called and told to come to ER for PPM.  Pt denies any recent symptoms - no cp/sob, no palpitations, no syncope/near syncope.  no abd pain,  no n/v/d.  no HA.  PE - nad, nc/at, eomi, perrl, op - clear, mmm, cta b/l, no w/r/r, rrr, abd - soft, nt/nd, nabs, from x 4, no LE swelling/tenderness, A&O x 3, no focal neuro deficits.  -check labs, cxr, ekg, admit

## 2022-09-13 NOTE — ED PROVIDER NOTE - NS ED MD DISPO ISOLATION TYPES
Minocycline Counseling: Patient advised regarding possible photosensitivity and discoloration of the teeth, skin, lips, tongue and gums.  Patient instructed to avoid sunlight, if possible.  When exposed to sunlight, patients should wear protective clothing, sunglasses, and sunscreen.  The patient was instructed to call the office immediately if the following severe adverse effects occur:  hearing changes, easy bruising/bleeding, severe headache, or vision changes.  The patient verbalized understanding of the proper use and possible adverse effects of minocycline.  All of the patient's questions and concerns were addressed. Tazorac Pregnancy And Lactation Text: This medication is not safe during pregnancy. It is unknown if this medication is excreted in breast milk. Erythromycin Pregnancy And Lactation Text: This medication is Pregnancy Category B and is considered safe during pregnancy. It is also excreted in breast milk. Erythromycin Counseling:  I discussed with the patient the risks of erythromycin including but not limited to GI upset, allergic reaction, drug rash, diarrhea, increase in liver enzymes, and yeast infections. Topical Clindamycin Pregnancy And Lactation Text: This medication is Pregnancy Category B and is considered safe during pregnancy. It is unknown if it is excreted in breast milk. Minocycline Pregnancy And Lactation Text: This medication is Pregnancy Category D and not consider safe during pregnancy. It is also excreted in breast milk. Doxycycline Pregnancy And Lactation Text: This medication is Pregnancy Category D and not consider safe during pregnancy. It is also excreted in breast milk but is considered safe for shorter treatment courses. Include Pregnancy/Lactation Warning?: No Benzoyl Peroxide Counseling: Patient counseled that medicine may cause skin irritation and bleach clothing.  In the event of skin irritation, the patient was advised to reduce the amount of the drug applied or use it less frequently.   The patient verbalized understanding of the proper use and possible adverse effects of benzoyl peroxide.  All of the patient's questions and concerns were addressed. Topical Sulfur Applications Counseling: Topical Sulfur Counseling: Patient counseled that this medication may cause skin irritation or allergic reactions.  In the event of skin irritation, the patient was advised to reduce the amount of the drug applied or use it less frequently.   The patient verbalized understanding of the proper use and possible adverse effects of topical sulfur application.  All of the patient's questions and concerns were addressed. Birth Control Pills Counseling: Birth Control Pill Counseling: I discussed with the patient the potential side effects of OCPs including but not limited to increased risk of stroke, heart attack, thrombophlebitis, deep venous thrombosis, hepatic adenomas, breast changes, GI upset, headaches, and depression.  The patient verbalized understanding of the proper use and possible adverse effects of OCPs. All of the patient's questions and concerns were addressed. Topical Retinoid Pregnancy And Lactation Text: This medication is Pregnancy Category C. It is unknown if this medication is excreted in breast milk. Detail Level: Zone Benzoyl Peroxide Pregnancy And Lactation Text: This medication is Pregnancy Category C. It is unknown if benzoyl peroxide is excreted in breast milk. Azithromycin Pregnancy And Lactation Text: This medication is considered safe during pregnancy and is also secreted in breast milk. Isotretinoin Pregnancy And Lactation Text: This medication is Pregnancy Category X and is considered extremely dangerous during pregnancy. It is unknown if it is excreted in breast milk. Topical Clindamycin Counseling: Patient counseled that this medication may cause skin irritation or allergic reactions.  In the event of skin irritation, the patient was advised to reduce the amount of the drug applied or use it less frequently.   The patient verbalized understanding of the proper use and possible adverse effects of clindamycin.  All of the patient's questions and concerns were addressed. Topical Retinoid counseling:  Patient advised to apply a pea-sized amount only at bedtime and wait 30 minutes after washing their face before applying.  If too drying, patient may add a non-comedogenic moisturizer. The patient verbalized understanding of the proper use and possible adverse effects of retinoids.  All of the patient's questions and concerns were addressed. Dapsone Pregnancy And Lactation Text: This medication is Pregnancy Category C and is not considered safe during pregnancy or breast feeding. Bactrim Counseling:  I discussed with the patient the risks of sulfa antibiotics including but not limited to GI upset, allergic reaction, drug rash, diarrhea, dizziness, photosensitivity, and yeast infections.  Rarely, more serious reactions can occur including but not limited to aplastic anemia, agranulocytosis, methemoglobinemia, blood dyscrasias, liver or kidney failure, lung infiltrates or desquamative/blistering drug rashes. Tazorac Counseling:  Patient advised that medication is irritating and drying.  Patient may need to apply sparingly and wash off after an hour before eventually leaving it on overnight.  The patient verbalized understanding of the proper use and possible adverse effects of tazorac.  All of the patient's questions and concerns were addressed. Bactrim Pregnancy And Lactation Text: This medication is Pregnancy Category D and is known to cause fetal risk.  It is also excreted in breast milk. High Dose Vitamin A Counseling: Side effects reviewed, pt to contact office should one occur. Birth Control Pills Pregnancy And Lactation Text: This medication should be avoided if pregnant and for the first 30 days post-partum. None Spironolactone Pregnancy And Lactation Text: This medication can cause feminization of the male fetus and should be avoided during pregnancy. The active metabolite is also found in breast milk. Azithromycin Counseling:  I discussed with the patient the risks of azithromycin including but not limited to GI upset, allergic reaction, drug rash, diarrhea, and yeast infections. Tetracycline Counseling: Patient counseled regarding possible photosensitivity and increased risk for sunburn.  Patient instructed to avoid sunlight, if possible.  When exposed to sunlight, patients should wear protective clothing, sunglasses, and sunscreen.  The patient was instructed to call the office immediately if the following severe adverse effects occur:  hearing changes, easy bruising/bleeding, severe headache, or vision changes.  The patient verbalized understanding of the proper use and possible adverse effects of tetracycline.  All of the patient's questions and concerns were addressed. Patient understands to avoid pregnancy while on therapy due to potential birth defects. High Dose Vitamin A Pregnancy And Lactation Text: High dose vitamin A therapy is contraindicated during pregnancy and breast feeding. Spironolactone Counseling: Patient advised regarding risks of diarrhea, abdominal pain, hyperkalemia, birth defects (for female patients), liver toxicity and renal toxicity. The patient may need blood work to monitor liver and kidney function and potassium levels while on therapy. The patient verbalized understanding of the proper use and possible adverse effects of spironolactone.  All of the patient's questions and concerns were addressed. Doxycycline Counseling:  Patient counseled regarding possible photosensitivity and increased risk for sunburn.  Patient instructed to avoid sunlight, if possible.  When exposed to sunlight, patients should wear protective clothing, sunglasses, and sunscreen.  The patient was instructed to call the office immediately if the following severe adverse effects occur:  hearing changes, easy bruising/bleeding, severe headache, or vision changes.  The patient verbalized understanding of the proper use and possible adverse effects of doxycycline.  All of the patient's questions and concerns were addressed. Dapsone Counseling: I discussed with the patient the risks of dapsone including but not limited to hemolytic anemia, agranulocytosis, rashes, methemoglobinemia, kidney failure, peripheral neuropathy, headaches, GI upset, and liver toxicity.  Patients who start dapsone require monitoring including baseline LFTs and weekly CBCs for the first month, then every month thereafter.  The patient verbalized understanding of the proper use and possible adverse effects of dapsone.  All of the patient's questions and concerns were addressed. Isotretinoin Counseling: Patient should get monthly blood tests, not donate blood, not drive at night if vision affected, not share medication, and not undergo elective surgery for 6 months after tx completed. Side effects reviewed, pt to contact office should one occur. Topical Sulfur Applications Pregnancy And Lactation Text: This medication is Pregnancy Category C and has an unknown safety profile during pregnancy. It is unknown if this topical medication is excreted in breast milk.

## 2022-09-13 NOTE — H&P ADULT - ASSESSMENT
Patient is a 77 year old female found to have episodes of 3rd degree AVB on ILR interrogation    # CHB  Admit to tele  Check TTE in AM  Plan for PPM in AM  Patient reports taking one medication of which she does not recall what it is. We will need to confirm with her local pharmacy in the AM Patient is a 77 year old female found to have episodes of 3rd degree AVB on ILR interrogation    # CHB  Admit to tele  Plan for PPM in AM  Patient reports taking one medication of which she does not recall what it is. We will need to confirm with her local pharmacy in the AM

## 2022-09-13 NOTE — H&P ADULT - NSHPLABSRESULTS_GEN_ALL_CORE
13.4   6.68  )-----------( 263      ( 13 Sep 2022 15:08 )             38.9   09-13    137  |  95<L>  |  20  ----------------------------<  101<H>  4.0   |  32  |  1.0    Ca    9.8      13 Sep 2022 15:08    TPro  6.4  /  Alb  4.3  /  TBili  0.4  /  DBili  x   /  AST  20  /  ALT  11  /  AlkPhos  86  09-13

## 2022-09-13 NOTE — ED PROVIDER NOTE - OBJECTIVE STATEMENT
76 y/o female with a PMH of dementia, HTN, and PVD presents to the ED advised for evaluation of abnormal rhythm on loop recorder. as per pt daughter, pt received a call from EP Dr. Salcido to come the ED to be admitted for pacemaker. pt has a syncopale episode in January 2022  and [employer] ILR at that time. Patient reports SOB on exertion with increased fatigue and dizziness intermittently since January. As per EP, ILR was interrogated and showed sinus pauses with periods of 3rd degree AVB. pt denies chest pain, sob, headache, dizziness, weakness, abdominal pain, n/v/d/c, leg pain, leg swelling, recent travel, recent trauma, recent surgeries, recent hospitalizations, hx of cancer, use of hormones, or hx of blood clots.

## 2022-09-13 NOTE — CONSULT NOTE ADULT - SUBJECTIVE AND OBJECTIVE BOX
Patient is a 77y old  Female who presents with a chief complaint of       HPI:      Electrophysiology:  77y Female    REVIEW OF SYSTEMS    [ ] A ten-point review of systems was otherwise negative except as noted.  [ ] Due to altered mental status/intubation, subjective information were not able to be obtained from the patient. History was obtained, to the extent possible, from review of the chart and collateral sources of information.      PAST MEDICAL & SURGICAL HISTORY:  H/O: hypertension      Mitral regurgitation      S/P hysterectomy          Home Medications:  amLODIPine 5 mg oral tablet: 1 tab(s) orally once a day (15 Mar 2022 15:28)  hydroCHLOROthiazide 25 mg oral tablet: 1 tab(s) orally once a day (15 Mar 2022 15:28)  rosuvastatin 20 mg oral tablet: 1 tab(s) orally once a day (15 Mar 2022 15:28)  sertraline 100 mg oral tablet: 1 tab(s) orally once a day (15 Mar 2022 15:28)      Allergies:  No Known Allergies      FAMILY HISTORY:  Family history of diabetes mellitus (DM) (Father, Mother)        SOCIAL HISTORY:    CIGARETTES:  ALCOHOL:  MARIJUANA:  ILLICIT DRUGS:        PREVIOUS DIAGNOSTIC TESTING:      ECHO  FINDINGS:    STRESS  FINDINGS:    CATHETERIZATION  FINDINGS:    ELECTROPHYSIOLOGY STUDY  FINDINGS:    CAROTID ULTRASOUND:  FINDINGS    VENOUS DUPLEX SCAN:  FINDINGS:    CHEST CT PULMONARY ANGIO with IV Contrast:  FINDINGS:      MEDICATIONS  (STANDING):    MEDICATIONS  (PRN):      Vital Signs Last 24 Hrs  T(C): 36.7 (13 Sep 2022 17:02), Max: 36.7 (13 Sep 2022 13:31)  T(F): 98.1 (13 Sep 2022 17:02), Max: 98.1 (13 Sep 2022 13:31)  HR: 70 (13 Sep 2022 17:02) (70 - 77)  BP: 114/58 (13 Sep 2022 17:02) (114/58 - 141/67)  BP(mean): --  RR: 18 (13 Sep 2022 17:02) (17 - 18)  SpO2: 98% (13 Sep 2022 17:02) (95% - 98%)    Parameters below as of 13 Sep 2022 17:02  Patient On (Oxygen Delivery Method): room air        PHYSICAL EXAM:    GENERAL: In no apparent distress, well nourished, and hydrated.  HEAD:  Atraumatic, Normocephalic  EYES: EOMI, PERRLA, conjunctiva and sclera clear  NECK: Supple and normal thyroid.  No JVD or carotid bruit.  Carotid pulse is 2+ bilaterally.  HEART: Regular rate and rhythm; No murmurs, rubs, or gallops.  PULMONARY: Clear to auscultation and perfusion.  No rales, wheezing, or rhonchi bilaterally.  ABDOMEN: Soft, Nontender, Nondistended; Bowel sounds present  EXTREMITIES:  2+ Peripheral Pulses, No clubbing, cyanosis, or edema  NEUROLOGICAL: Grossly nonfocal    I&O's Detail    Daily Height in cm: 162.56 (13 Sep 2022 13:31)    Daily     INTERPRETATION OF TELEMETRY:    EC Lead ECG:   Ventricular Rate 77 BPM    Atrial Rate 77 BPM    P-R Interval 180 ms    QRS Duration 144 ms    Q-T Interval 422 ms    QTC Calculation(Bazett) 477 ms    P Axis 43 degrees    R Axis 40 degrees    T Axis 37 degrees    Diagnosis Line Normal sinus rhythm  Right bundle branch block  Abnormal ECG    Confirmed by Jeffrey Ruiz ([id number]) on 2022 3:55:06 PM (22 @ 13:41)        LABS:                        13.4   6.68  )-----------( 263      ( 13 Sep 2022 15:08 )             38.9         137  |  95<L>  |  20  ----------------------------<  101<H>  4.0   |  32  |  1.0    Ca    9.8      13 Sep 2022 15:08    TPro  6.4  /  Alb  4.3  /  TBili  0.4  /  DBili  x   /  AST  20  /  ALT  11  /  AlkPhos  86      CARDIAC MARKERS ( 13 Sep 2022 15:08 )  x     / <0.01 ng/mL / x     / x     / x          PT/INR - ( 13 Sep 2022 15:08 )   PT: 11.30 sec;   INR: 0.98 ratio         PTT - ( 13 Sep 2022 15:08 )  PTT:24.5 sec    BNPSerum Pro-Brain Natriuretic Peptide: 32 pg/mL ( @ 15:08)              RADIOLOGY & ADDITIONAL STUDIES:       Patient is a 77y old  Female who presents with a chief complaint of       HPI:      Electrophysiology:  77y Female with h/o HTN, admission to [facility] for syncopal episode , ILR placed at that time 22, was 2 episodes of pauses on 22 3 sec each, and episode of bradycardia 22. On review of events was found to have episodes of CHB. Patient reports worsening dyspnea no LOC    REVIEW OF SYSTEMS    [x ] A ten-point review of systems was otherwise negative except as noted.  [ ] Due to altered mental status/intubation, subjective information were not able to be obtained from the patient. History was obtained, to the extent possible, from review of the chart and collateral sources of information.      PAST MEDICAL & SURGICAL HISTORY:  H/O: hypertension    carotid stenosis   Mitral regurgitation      S/P hysterectomy          Home Medications:  amLODIPine 5 mg oral tablet: 1 tab(s) orally once a day (15 Mar 2022 15:28)  hydroCHLOROthiazide 25 mg oral tablet: 1 tab(s) orally once a day (15 Mar 2022 15:28)  rosuvastatin 20 mg oral tablet: 1 tab(s) orally once a day (15 Mar 2022 15:28)  sertraline 100 mg oral tablet: 1 tab(s) orally once a day (15 Mar 2022 15:28)      Allergies:  No Known Allergies      FAMILY HISTORY:  Family history of diabetes mellitus (DM) (Father, Mother)        SOCIAL HISTORY: denies tobacco / ETOH / illicit drug use     CIGARETTES: former smoker   ALCOHOL:  MARIJUANA:  ILLICIT DRUGS:        PREVIOUS DIAGNOSTIC TESTING:      ECHO  FINDINGS:    < from: TTE Echo Complete w/o Contrast w/ Doppler (22 @ 08:38) >  Summary:   1. Left ventricular ejection fraction, by visual estimation, is 55 to   60%.   2. Normal left atrial size.   3. Normal right atrial size.   4. Mild mitral annular calcification.   5. Mild thickening and calcification of the anterior and posterior   mitral valve leaflets.   6. Moderate to severe mitral valve regurgitation.   7. Mild tricuspid regurgitation.   8. Mild aortic regurgitation.   9. Sclerotic aortic valve with normal opening.    < end of copied text >    < from: Transesophageal Echocardiogram (03.15.22 @ 15:33) >  Summary:   1. Left ventricular ejection fraction, by visual estimation, is 55 to   60%.   2. Mild mitral valve regurgitation.   3. Moderate aortic regurgitation.   4. Mild pulmonic valve regurgitation.   5. Mildly enlarged left atrium.   6. No left atrial appendage thrombus.   7. Dilatation of the aortic root and ascending aorta.   8. Aortic root measured at 3.6 cm. Ascending Aorta measured at 3.9 cm.   9. Normal global left ventricular systolic function.    < end of copied text >    STRESS  FINDINGS:  < from: NM Nuclear Stress Pharmacologic Multiple (22 @ 13:09) >  Impression:  1. NO EVIDENCE FOR ISCHEMIA DURING LEXISCAN INFUSION.  2. NORMAL RESTING LEFT VENTRICULAR WALL MOTION AND WALL THICKENING.  3. LEFT VENTRICULAR EJECTION FRACTION OF  79 % WHICH IS WITHIN RANGE OF   NORMAL.    < end of copied text >    CATHETERIZATION  FINDINGS:    ELECTROPHYSIOLOGY STUDY  FINDINGS:    CAROTID ULTRASOUND:  FINDINGS    VENOUS DUPLEX SCAN:  FINDINGS:    CHEST CT PULMONARY ANGIO with IV Contrast:  FINDINGS:      MEDICATIONS  (STANDING):    MEDICATIONS  (PRN):      Vital Signs Last 24 Hrs  T(C): 36.7 (13 Sep 2022 17:02), Max: 36.7 (13 Sep 2022 13:31)  T(F): 98.1 (13 Sep 2022 17:02), Max: 98.1 (13 Sep 2022 13:31)  HR: 70 (13 Sep 2022 17:02) (70 - 77)  BP: 114/58 (13 Sep 2022 17:02) (114/58 - 141/67)  BP(mean): --  RR: 18 (13 Sep 2022 17:02) (17 - 18)  SpO2: 98% (13 Sep 2022 17:02) (95% - 98%)    Parameters below as of 13 Sep 2022 17:02  Patient On (Oxygen Delivery Method): room air        PHYSICAL EXAM:    GENERAL: In no apparent distress, well nourished, and hydrated.  HEAD:  Atraumatic, Normocephalic  EYES: EOMI, PERRLA, conjunctiva and sclera clear  NECK: Supple and normal thyroid.  No JVD or carotid bruit.  Carotid pulse is 2+ bilaterally.  HEART: Regular rate and rhythm; No murmurs, rubs, or gallops.  PULMONARY: Clear to auscultation and perfusion.  No rales, wheezing, or rhonchi bilaterally.  ABDOMEN: Soft, Nontender, Nondistended; Bowel sounds present  EXTREMITIES:  2+ Peripheral Pulses, No clubbing, cyanosis, or edema  NEUROLOGICAL: Grossly nonfocal    I&O's Detail    Daily Height in cm: 162.56 (13 Sep 2022 13:31)    Daily     INTERPRETATION OF TELEMETRY:    EC Lead ECG:   Ventricular Rate 77 BPM    Atrial Rate 77 BPM    P-R Interval 180 ms    QRS Duration 144 ms    Q-T Interval 422 ms    QTC Calculation(Bazett) 477 ms    P Axis 43 degrees    R Axis 40 degrees    T Axis 37 degrees    Diagnosis Line Normal sinus rhythm  Right bundle branch block  Abnormal ECG    Confirmed by Jeffrey Ruiz ([id number]) on 2022 3:55:06 PM (22 @ 13:41)        LABS:                        13.4   6.68  )-----------( 263      ( 13 Sep 2022 15:08 )             38.9         137  |  95<L>  |  20  ----------------------------<  101<H>  4.0   |  32  |  1.0    Ca    9.8      13 Sep 2022 15:08    TPro  6.4  /  Alb  4.3  /  TBili  0.4  /  DBili  x   /  AST  20  /  ALT  11  /  AlkPhos  86  13    CARDIAC MARKERS ( 13 Sep 2022 15:08 )  x     / <0.01 ng/mL / x     / x     / x          PT/INR - ( 13 Sep 2022 15:08 )   PT: 11.30 sec;   INR: 0.98 ratio         PTT - ( 13 Sep 2022 15:08 )  PTT:24.5 sec    BNPSerum Pro-Brain Natriuretic Peptide: 32 pg/mL ( @ 15:08)              RADIOLOGY & ADDITIONAL STUDIES:

## 2022-09-13 NOTE — CONSULT NOTE ADULT - ASSESSMENT
WINNIE Smalls    77y Female with h/o HTN, admission to [facility] for syncopal episode 1/22, ILR placed at that time 1/20/22, was 2 episodes of pauses on 8/30/22 3 sec each, and episode of bradycardia 8/23/22. On review of events was found to have episodes of CHB.    intermittent CHB  symptomatic bradycardia  HTN    admit to tele, please contact Dr Cortes team  repeat Echo   Check TSH / Free T4  PPM, Micra AV (addon)  NPO post MN  COVID pending  Maintain electrolytes K>4.0 Mg >2.0

## 2022-09-13 NOTE — H&P ADULT - HISTORY OF PRESENT ILLNESS
Patient is a 77 year old female with PMHx of HTN, PVD, Syncope in January 2022 who had ILR at that time was referred to [facility] ED for abnormal ILR interrogation. Patient reports SOB on exertion with increased fatigue and dizziness intermittently for the past week. No chest pain, orthopnea, palpitations or edema. ILR was interrogated and showed sinus pauses with periods of 3rd degree AVB and showed referred for PPM implantation In ED VSS and 12 Lead EKG showed SR with RBBB and NSST changes.    Nuc 8/28/22 No Ischemia LVEF 79%  BESSY 3/15 LVEF 60%, Mild MR, Mod AR

## 2022-09-13 NOTE — H&P ADULT - NS ATTEND AMEND GEN_ALL_CORE FT
Admitted for CHB on ILR, s/p PPM on 9/14/22. Will monitor overnight after her device. Awaiting TSH and FT4.

## 2022-09-13 NOTE — ED ADULT NURSE NOTE - NSIMPLEMENTINTERV_GEN_ALL_ED
Implemented All Universal Safety Interventions:  [place] to call system. Call bell, personal items and telephone within reach. Instruct patient to call for assistance. Room bathroom lighting operational. Non-slip footwear when patient is off stretcher. Physically safe environment: no spills, clutter or unnecessary equipment. Stretcher in lowest position, wheels locked, appropriate side rails in place.

## 2022-09-13 NOTE — H&P ADULT - NSICDXFAMILYHX_GEN_ALL_CORE_FT
FAMILY HISTORY:  Father  Still living? No  Family history of diabetes mellitus (DM), Age at diagnosis: Age Unknown    Mother  Still living? No  Family history of diabetes mellitus (DM), Age at diagnosis: Age Unknown

## 2022-09-13 NOTE — ED ADULT TRIAGE NOTE - CHIEF COMPLAINT QUOTE
Pt sent in by MD Smalls for pace maker placement. MD wants her to have it placed tomorrow. denies chest pain. complains of intermittent dizziness

## 2022-09-13 NOTE — CONSULT NOTE ADULT - NS ATTEND AMEND GEN_ALL_CORE FT
complex patient  intermittent AV block with ventricular escape < 40 bpm  history of syncope  recommend PPM placement  We discussed the risks/benefits/alternatives, nature of procedure, and follow up care after device is implanted. We also discussed remote monitoring in details. Patient is in agreement with proceeding with the device implant. We discussed the risks of bleeding, hematoma, injury to vessels and heart, perforation, tamponade, pneumothorax, infection, lead dislodgment, device malfunction, and rare risks of stroke/heart attack/death. Patient expressed understanding of the discussion. I answered all the questions in details.

## 2022-09-13 NOTE — ED PROVIDER NOTE - PROGRESS NOTE DETAILS
FF: I spoke with cardio np, requesting cardiac echo and admit FF: I spoke with dr. lewis would like pt to be admitted to cardio tele but spoke with cardio tele no beds at this time. FF: spoke cardio giovanny magana, there is now a female bed open on cardio tele can take pt now.

## 2022-09-13 NOTE — ED PROVIDER NOTE - PHYSICAL EXAMINATION
Physical Exam    Vital Signs: I have reviewed the initial vital signs.  Constitutional: well-nourished, appears stated age, no acute distress  Eyes: Conjunctiva pink, Sclera clear  Cardiovascular: S1 and S2, regular rate, regular rhythm, well-perfused extremities, radial pulses equal and 2+ b/l.   Respiratory: unlabored respiratory effort, clear to auscultation bilaterally no wheezing, rales and rhonchi. pt is speaking full sentences. no accessory muscle use.   Gastrointestinal: soft, non-tender, nondistended abdomen, no pulsatile mass, normal bowl sounds, no rebound, no guarding  Musculoskeletal: supple neck, no lower extremity edema, no calf tenderness  Integumentary: warm, dry, no rash  Neurologic: awake, alert, cranial nerves II-XII grossly intact, extremities’ motor and sensory functions grossly intact.   Psychiatric: appropriate mood, appropriate affect

## 2022-09-14 ENCOUNTER — TRANSCRIPTION ENCOUNTER (OUTPATIENT)
Age: 77
End: 2022-09-14

## 2022-09-14 LAB
ANION GAP SERPL CALC-SCNC: 10 MMOL/L — SIGNIFICANT CHANGE UP (ref 7–14)
ANION GAP SERPL CALC-SCNC: 13 MMOL/L — SIGNIFICANT CHANGE UP (ref 7–14)
BUN SERPL-MCNC: 16 MG/DL — SIGNIFICANT CHANGE UP (ref 10–20)
BUN SERPL-MCNC: 16 MG/DL — SIGNIFICANT CHANGE UP (ref 10–20)
CALCIUM SERPL-MCNC: 9 MG/DL — SIGNIFICANT CHANGE UP (ref 8.4–10.5)
CALCIUM SERPL-MCNC: 9.6 MG/DL — SIGNIFICANT CHANGE UP (ref 8.4–10.4)
CHLORIDE SERPL-SCNC: 100 MMOL/L — SIGNIFICANT CHANGE UP (ref 98–110)
CHLORIDE SERPL-SCNC: 99 MMOL/L — SIGNIFICANT CHANGE UP (ref 98–110)
CO2 SERPL-SCNC: 27 MMOL/L — SIGNIFICANT CHANGE UP (ref 17–32)
CO2 SERPL-SCNC: 30 MMOL/L — SIGNIFICANT CHANGE UP (ref 17–32)
CREAT SERPL-MCNC: 0.8 MG/DL — SIGNIFICANT CHANGE UP (ref 0.7–1.5)
CREAT SERPL-MCNC: 0.9 MG/DL — SIGNIFICANT CHANGE UP (ref 0.7–1.5)
EGFR: 66 ML/MIN/1.73M2 — SIGNIFICANT CHANGE UP
EGFR: 76 ML/MIN/1.73M2 — SIGNIFICANT CHANGE UP
GLUCOSE BLDC GLUCOMTR-MCNC: 103 MG/DL — HIGH (ref 70–99)
GLUCOSE BLDC GLUCOMTR-MCNC: 207 MG/DL — HIGH (ref 70–99)
GLUCOSE SERPL-MCNC: 148 MG/DL — HIGH (ref 70–99)
GLUCOSE SERPL-MCNC: 99 MG/DL — SIGNIFICANT CHANGE UP (ref 70–99)
HCT VFR BLD CALC: 35.4 % — LOW (ref 37–47)
HCV AB S/CO SERPL IA: 0.03 COI — SIGNIFICANT CHANGE UP
HCV AB SERPL-IMP: SIGNIFICANT CHANGE UP
HGB BLD-MCNC: 12.4 G/DL — SIGNIFICANT CHANGE UP (ref 12–16)
MCHC RBC-ENTMCNC: 29.2 PG — SIGNIFICANT CHANGE UP (ref 27–31)
MCHC RBC-ENTMCNC: 35 G/DL — SIGNIFICANT CHANGE UP (ref 32–37)
MCV RBC AUTO: 83.5 FL — SIGNIFICANT CHANGE UP (ref 81–99)
NRBC # BLD: 0 /100 WBCS — SIGNIFICANT CHANGE UP (ref 0–0)
PLATELET # BLD AUTO: 259 K/UL — SIGNIFICANT CHANGE UP (ref 130–400)
POTASSIUM SERPL-MCNC: 3 MMOL/L — LOW (ref 3.5–5)
POTASSIUM SERPL-MCNC: 3.5 MMOL/L — SIGNIFICANT CHANGE UP (ref 3.5–5)
POTASSIUM SERPL-SCNC: 3 MMOL/L — LOW (ref 3.5–5)
POTASSIUM SERPL-SCNC: 3.5 MMOL/L — SIGNIFICANT CHANGE UP (ref 3.5–5)
RBC # BLD: 4.24 M/UL — SIGNIFICANT CHANGE UP (ref 4.2–5.4)
RBC # FLD: 12.6 % — SIGNIFICANT CHANGE UP (ref 11.5–14.5)
SODIUM SERPL-SCNC: 139 MMOL/L — SIGNIFICANT CHANGE UP (ref 135–146)
SODIUM SERPL-SCNC: 140 MMOL/L — SIGNIFICANT CHANGE UP (ref 135–146)
T4 FREE SERPL-MCNC: 1.3 NG/DL — SIGNIFICANT CHANGE UP (ref 0.9–1.8)
TSH SERPL-MCNC: 2.28 UIU/ML — SIGNIFICANT CHANGE UP (ref 0.27–4.2)
WBC # BLD: 7.93 K/UL — SIGNIFICANT CHANGE UP (ref 4.8–10.8)
WBC # FLD AUTO: 7.93 K/UL — SIGNIFICANT CHANGE UP (ref 4.8–10.8)

## 2022-09-14 PROCEDURE — 33286 RMVL SUBQ CAR RHYTHM MNTR: CPT | Mod: 59

## 2022-09-14 PROCEDURE — 99221 1ST HOSP IP/OBS SF/LOW 40: CPT

## 2022-09-14 PROCEDURE — 33208 INSRT HEART PM ATRIAL & VENT: CPT | Mod: KX

## 2022-09-14 PROCEDURE — 71045 X-RAY EXAM CHEST 1 VIEW: CPT | Mod: 26

## 2022-09-14 RX ORDER — ACETAMINOPHEN 500 MG
1000 TABLET ORAL ONCE
Refills: 0 | Status: DISCONTINUED | OUTPATIENT
Start: 2022-09-14 | End: 2022-09-15

## 2022-09-14 RX ORDER — DONEPEZIL HYDROCHLORIDE 10 MG/1
1 TABLET, FILM COATED ORAL
Qty: 0 | Refills: 0 | DISCHARGE
Start: 2022-09-14

## 2022-09-14 RX ORDER — DONEPEZIL HYDROCHLORIDE 10 MG/1
23 TABLET, FILM COATED ORAL AT BEDTIME
Refills: 0 | Status: DISCONTINUED | OUTPATIENT
Start: 2022-09-14 | End: 2022-09-14

## 2022-09-14 RX ORDER — DONEPEZIL HYDROCHLORIDE 10 MG/1
10 TABLET, FILM COATED ORAL
Refills: 0 | Status: DISCONTINUED | OUTPATIENT
Start: 2022-09-14 | End: 2022-09-15

## 2022-09-14 RX ORDER — ACETAMINOPHEN 500 MG
650 TABLET ORAL EVERY 6 HOURS
Refills: 0 | Status: DISCONTINUED | OUTPATIENT
Start: 2022-09-14 | End: 2022-09-15

## 2022-09-14 RX ORDER — CEFAZOLIN SODIUM 1 G
2000 VIAL (EA) INJECTION ONCE
Refills: 0 | Status: DISCONTINUED | OUTPATIENT
Start: 2022-09-14 | End: 2022-09-15

## 2022-09-14 RX ORDER — ATORVASTATIN CALCIUM 80 MG/1
80 TABLET, FILM COATED ORAL AT BEDTIME
Refills: 0 | Status: DISCONTINUED | OUTPATIENT
Start: 2022-09-14 | End: 2022-09-15

## 2022-09-14 RX ORDER — HYDROCHLOROTHIAZIDE 25 MG
25 TABLET ORAL DAILY
Refills: 0 | Status: DISCONTINUED | OUTPATIENT
Start: 2022-09-14 | End: 2022-09-15

## 2022-09-14 RX ORDER — MORPHINE SULFATE 50 MG/1
2 CAPSULE, EXTENDED RELEASE ORAL ONCE
Refills: 0 | Status: DISCONTINUED | OUTPATIENT
Start: 2022-09-14 | End: 2022-09-14

## 2022-09-14 RX ORDER — CEFAZOLIN SODIUM 1 G
1000 VIAL (EA) INJECTION EVERY 8 HOURS
Refills: 0 | Status: COMPLETED | OUTPATIENT
Start: 2022-09-14 | End: 2022-09-15

## 2022-09-14 RX ORDER — SIMVASTATIN 20 MG/1
20 TABLET, FILM COATED ORAL AT BEDTIME
Refills: 0 | Status: DISCONTINUED | OUTPATIENT
Start: 2022-09-14 | End: 2022-09-14

## 2022-09-14 RX ORDER — POTASSIUM CHLORIDE 20 MEQ
20 PACKET (EA) ORAL ONCE
Refills: 0 | Status: COMPLETED | OUTPATIENT
Start: 2022-09-14 | End: 2022-09-14

## 2022-09-14 RX ORDER — SODIUM CHLORIDE 9 MG/ML
1000 INJECTION, SOLUTION INTRAVENOUS
Refills: 0 | Status: DISCONTINUED | OUTPATIENT
Start: 2022-09-14 | End: 2022-09-15

## 2022-09-14 RX ORDER — INFLUENZA VIRUS VACCINE 15; 15; 15; 15 UG/.5ML; UG/.5ML; UG/.5ML; UG/.5ML
0.7 SUSPENSION INTRAMUSCULAR ONCE
Refills: 0 | Status: DISCONTINUED | OUTPATIENT
Start: 2022-09-14 | End: 2022-09-15

## 2022-09-14 RX ORDER — POTASSIUM CHLORIDE 20 MEQ
20 PACKET (EA) ORAL
Refills: 0 | Status: COMPLETED | OUTPATIENT
Start: 2022-09-14 | End: 2022-09-15

## 2022-09-14 RX ORDER — POTASSIUM CHLORIDE 20 MEQ
10 PACKET (EA) ORAL DAILY
Refills: 0 | Status: DISCONTINUED | OUTPATIENT
Start: 2022-09-14 | End: 2022-09-15

## 2022-09-14 RX ORDER — AMLODIPINE BESYLATE 2.5 MG/1
5 TABLET ORAL DAILY
Refills: 0 | Status: DISCONTINUED | OUTPATIENT
Start: 2022-09-14 | End: 2022-09-15

## 2022-09-14 RX ADMIN — SODIUM CHLORIDE 75 MILLILITER(S): 9 INJECTION, SOLUTION INTRAVENOUS at 18:17

## 2022-09-14 RX ADMIN — Medication 100 MILLIGRAM(S): at 18:15

## 2022-09-14 RX ADMIN — ATORVASTATIN CALCIUM 80 MILLIGRAM(S): 80 TABLET, FILM COATED ORAL at 21:45

## 2022-09-14 RX ADMIN — Medication 650 MILLIGRAM(S): at 12:00

## 2022-09-14 RX ADMIN — MORPHINE SULFATE 2 MILLIGRAM(S): 50 CAPSULE, EXTENDED RELEASE ORAL at 13:22

## 2022-09-14 RX ADMIN — Medication 20 MILLIEQUIVALENT(S): at 22:52

## 2022-09-14 RX ADMIN — Medication 20 MILLIEQUIVALENT(S): at 16:43

## 2022-09-14 RX ADMIN — SODIUM CHLORIDE 75 MILLILITER(S): 9 INJECTION, SOLUTION INTRAVENOUS at 20:49

## 2022-09-14 NOTE — DISCHARGE NOTE PROVIDER - HOSPITAL COURSE
Patient is a 77yr old female with pmhx of HTN, PVD, Syncope in January 2022 (ILR implanted at the time) presenting for abnormal ILR interrogation, found to have pause lasting 3 seconds on 8/30 @ 6:51pm. Patient reported SOB on exertion with increased fatigue and dizziness intermittently for 1 week. Patient was advised to come to ED for PPM implant.  She underwent PPM placement  ([employer]- DC ) for complete/High degree AV  heart block. Procedure was successfully completed by Dr. Smalls on  9/14 without hematoma, skin breakdown or ecchymoses. Patient was monitored overnight POD 1 patient remains HD stable. No pneumothorax on CXRAY and device interrogation shows normal device function. Examination of the L chest; access site is without hematoma, bruising or bleeding. Patient is hemodynamically stable for dc home today and will followup with MELISSA Tesfaye at 10/19 @ 10am.   Patient is a 77yr old female with HTN, PVD, and Syncope in January 2022 (ILR implanted at the time) presenting for abnormal ILR interrogation, found to have pause lasting 3 seconds on 8/30 @ 6:51pm. Patient reported SOB on exertion with increased fatigue and dizziness intermittently for 1 week. TSH and free T4 are within normal limits. Patient was advised to come to ED for PPM implant.  She underwent PPM placement ([employer] dual chamber) for complete/High degree AV heart block. Procedure was successfully completed by Dr. Smalls on  9/14 without hematoma, skin breakdown or ecchymoses. Patient was monitored overnight POD 1 patient remains HD stable. No pneumothorax on CXRAY and device interrogation shows normal device function. Examination of the L chest; access site is without hematoma, bruising or bleeding. Patient is hemodynamically stable for dc home today and will followup with MELISSA Tesfaye at 10/19 @ 10am.

## 2022-09-14 NOTE — DISCHARGE NOTE PROVIDER - NSDCCPCAREPLAN_GEN_ALL_CORE_FT
PRINCIPAL DISCHARGE DIAGNOSIS  Diagnosis: Cardiac pacemaker recipient  Assessment and Plan of Treatment:

## 2022-09-14 NOTE — DISCHARGE NOTE PROVIDER - NSDCFUSCHEDAPPT_GEN_ALL_CORE_FT
Luz Richards  [facility]  Cardio [address]  Scheduled Appointment: 09/26/2022    [facility]  CARDIOLOGY [address]  Scheduled Appointment: 10/19/2022    [facility]  CARDIOLOGY [address]  Scheduled Appointment: 10/31/2022

## 2022-09-14 NOTE — DISCHARGE NOTE PROVIDER - CARE PROVIDER_API CALL
Xuan Gill (NP; RN)  NP in Adult Health  [address] [facility], [address] [address]  Phone: [phone]  Fax: [phone]  Scheduled Appointment: 10/19/2022 10:00 AM

## 2022-09-14 NOTE — DISCHARGE NOTE PROVIDER - ATTENDING DISCHARGE PHYSICAL EXAMINATION:
Left chest wall incision site is clean, dry, and intact with no hematoma or tenderness. No pneumothorax on CXR.

## 2022-09-14 NOTE — DISCHARGE NOTE PROVIDER - NSDCCPTREATMENT_GEN_ALL_CORE_FT
PRINCIPAL PROCEDURE  Procedure: Insertion of dual lead pacemaker-defibrillator  Findings and Treatment: You underwent DC ppm ([employer]- DC) placement with Dr. Smalls on 9/14/22 without complications.   - Please take Keflex 500 mg (Bactrim / doxycycline 100 mg) twice daily for 5 days.  - Do not shower for 5 days   - Do not drive or operate heavy machinery for 1 week   - Do not submerge in water (example: baths, swimming) for 1 month.  - Do not lift your left arm greater than shoulder height for 6 weeks.  - Do not lift anything heavier than 5-10 lbs with your left arm for 6 weeks.  - Any sudden swelling, redness, fever, discharge, or severe pain, call the Electrophysiology Office at [phone].       PRINCIPAL PROCEDURE  Procedure: Insertion of dual lead pacemaker-defibrillator  Findings and Treatment: You underwent DC ppm ([employer]- DC) placement with Dr. Smalls on 9/14/22 without complications.   - Do not shower for 5 days   - Do not drive or operate heavy machinery for 1 week   - Do not submerge in water (example: baths, swimming) for 1 month.  - Do not lift your left arm greater than shoulder height for 6 weeks.  - Do not lift anything heavier than 5-10 lbs with your left arm for 6 weeks.  - Any sudden swelling, redness, fever, discharge, or severe pain, call the Electrophysiology Office at [phone].

## 2022-09-14 NOTE — DISCHARGE NOTE PROVIDER - NSDCMRMEDTOKEN_GEN_ALL_CORE_FT
donepezil 23 mg oral tablet: 1 tab(s) orally once a day (at bedtime)  hydroCHLOROthiazide 25 mg oral tablet: 1 tab(s) orally once a day  Norvasc 5 mg oral tablet: 1 tab(s) orally once a day  potassium chloride 10 mEq oral tablet, extended release: 1 tab(s) orally once a day  simvastatin 20 mg oral tablet: 1 tab(s) orally once a day (at bedtime)  Zoloft 100 mg oral tablet: 1 tab(s) orally once a day

## 2022-09-14 NOTE — CHART NOTE - NSCHARTNOTEFT_GEN_A_CORE
Electrophysiology Brief Post-Operative Note    I have personally seen and examined the patient.  I agree with the history and physical which I have reviewed and noted any changes below.     DEVICE COMPANY:  -[employer]    PRE-OP DIAGNOSIS:   Complete/High degree AV Heart Block      POST-OP DIAGNOSIS:   Complete/High degree AV Heart Block    PROCEDURE:  Permanent Pacemaker Implant    Physician: OZ Smalls MD  Assistant: None    ANESTHESIA TYPE:  [  ]General Anesthesia  [X] Sedation  [X] Local/Regional    CONDITION  [  ] Critical  [  ] Serious  [  ]Fair  [X]Good    SPECIMENS REMOVED (IF APPLICABLE): None    IMPLANTS (IF APPLICABLE)  Dual Chamber Pacemker Implant      FINDINGS (see below)   -Successful Pacemaker implant   -No immediate complications   -Estimated Blood Loss: 20  mL   -Contrast used: 0cc    PLAN OF CARE  - Ancef 1g IVq8 h for 2 doses  - Chest X-ray portable now  - Chest X-ray PA/Lat tomorrow at 6am  - Device interrogation tomorrow in am  - Discontinue Heparin, Lovenox, and NOACS for 48 hours  - No anticoagulation unless discussed with EP attending      FOLLOW-UP  -Outpatient follow-up with Electrophysiology in 3-4 weeks     [address] [place] [address] ([address])[facility] [phone] Statement Selected

## 2022-09-14 NOTE — PATIENT PROFILE ADULT - FALL HARM RISK - UNIVERSAL INTERVENTIONS
Bed in lowest position, wheels locked, appropriate side rails in place/Call bell, personal items and telephone in reach/Instruct patient to call for assistance before getting out of bed or chair/Non-slip footwear when patient is out of bed/[place] to call system/Physically safe environment - no spills, clutter or unnecessary equipment/Purposeful Proactive Rounding/Room/bathroom lighting operational, light cord in reach

## 2022-09-15 ENCOUNTER — TRANSCRIPTION ENCOUNTER (OUTPATIENT)
Age: 77
End: 2022-09-15

## 2022-09-15 VITALS
RESPIRATION RATE: 18 BRPM | DIASTOLIC BLOOD PRESSURE: 58 MMHG | SYSTOLIC BLOOD PRESSURE: 123 MMHG | TEMPERATURE: 97 F | HEART RATE: 97 BPM

## 2022-09-15 LAB
ALBUMIN SERPL ELPH-MCNC: 3.7 G/DL — SIGNIFICANT CHANGE UP (ref 3.5–5.2)
ALP SERPL-CCNC: 118 U/L — HIGH (ref 30–115)
ALT FLD-CCNC: 22 U/L — SIGNIFICANT CHANGE UP (ref 0–41)
ANION GAP SERPL CALC-SCNC: 10 MMOL/L — SIGNIFICANT CHANGE UP (ref 7–14)
AST SERPL-CCNC: 46 U/L — HIGH (ref 0–41)
BILIRUB SERPL-MCNC: 0.4 MG/DL — SIGNIFICANT CHANGE UP (ref 0.2–1.2)
BUN SERPL-MCNC: 10 MG/DL — SIGNIFICANT CHANGE UP (ref 10–20)
CALCIUM SERPL-MCNC: 9.1 MG/DL — SIGNIFICANT CHANGE UP (ref 8.4–10.4)
CHLORIDE SERPL-SCNC: 106 MMOL/L — SIGNIFICANT CHANGE UP (ref 98–110)
CO2 SERPL-SCNC: 25 MMOL/L — SIGNIFICANT CHANGE UP (ref 17–32)
CREAT SERPL-MCNC: 0.7 MG/DL — SIGNIFICANT CHANGE UP (ref 0.7–1.5)
EGFR: 89 ML/MIN/1.73M2 — SIGNIFICANT CHANGE UP
GLUCOSE BLDC GLUCOMTR-MCNC: 125 MG/DL — HIGH (ref 70–99)
GLUCOSE BLDC GLUCOMTR-MCNC: 94 MG/DL — SIGNIFICANT CHANGE UP (ref 70–99)
GLUCOSE SERPL-MCNC: 92 MG/DL — SIGNIFICANT CHANGE UP (ref 70–99)
HCT VFR BLD CALC: 35.2 % — LOW (ref 37–47)
HGB BLD-MCNC: 11.9 G/DL — LOW (ref 12–16)
MAGNESIUM SERPL-MCNC: 2.1 MG/DL — SIGNIFICANT CHANGE UP (ref 1.8–2.4)
MCHC RBC-ENTMCNC: 29.2 PG — SIGNIFICANT CHANGE UP (ref 27–31)
MCHC RBC-ENTMCNC: 33.8 G/DL — SIGNIFICANT CHANGE UP (ref 32–37)
MCV RBC AUTO: 86.3 FL — SIGNIFICANT CHANGE UP (ref 81–99)
NRBC # BLD: 0 /100 WBCS — SIGNIFICANT CHANGE UP (ref 0–0)
PLATELET # BLD AUTO: 216 K/UL — SIGNIFICANT CHANGE UP (ref 130–400)
POTASSIUM SERPL-MCNC: 4.4 MMOL/L — SIGNIFICANT CHANGE UP (ref 3.5–5)
POTASSIUM SERPL-SCNC: 4.4 MMOL/L — SIGNIFICANT CHANGE UP (ref 3.5–5)
PROT SERPL-MCNC: 5.5 G/DL — LOW (ref 6–8)
RBC # BLD: 4.08 M/UL — LOW (ref 4.2–5.4)
RBC # FLD: 12.8 % — SIGNIFICANT CHANGE UP (ref 11.5–14.5)
SODIUM SERPL-SCNC: 141 MMOL/L — SIGNIFICANT CHANGE UP (ref 135–146)
WBC # BLD: 6.55 K/UL — SIGNIFICANT CHANGE UP (ref 4.8–10.8)
WBC # FLD AUTO: 6.55 K/UL — SIGNIFICANT CHANGE UP (ref 4.8–10.8)

## 2022-09-15 PROCEDURE — 99024 POSTOP FOLLOW-UP VISIT: CPT

## 2022-09-15 PROCEDURE — 99238 HOSP IP/OBS DSCHRG MGMT 30/<: CPT

## 2022-09-15 PROCEDURE — 93280 PM DEVICE PROGR EVAL DUAL: CPT | Mod: 26

## 2022-09-15 PROCEDURE — 71046 X-RAY EXAM CHEST 2 VIEWS: CPT | Mod: 26

## 2022-09-15 RX ADMIN — Medication 20 MILLIEQUIVALENT(S): at 02:57

## 2022-09-15 RX ADMIN — Medication 20 MILLIEQUIVALENT(S): at 00:18

## 2022-09-15 RX ADMIN — Medication 25 MILLIGRAM(S): at 05:15

## 2022-09-15 RX ADMIN — AMLODIPINE BESYLATE 5 MILLIGRAM(S): 2.5 TABLET ORAL at 05:15

## 2022-09-15 RX ADMIN — Medication 100 MILLIGRAM(S): at 02:56

## 2022-09-15 NOTE — PROGRESS NOTE ADULT - ASSESSMENT
77y Female with h/o HTN, admission to [facility] for syncopal episode 1/22, ILR placed at that time 1/20/22, was 2 episodes of pauses on 8/30/22 3 sec each, and episode of bradycardia 8/23/22. On review of events was found to have episodes of CHB.    intermittent CHB s/p pacemaker placement 09/14    - Interrogation done this am and reviewed  - CXR reviewed  - L arm restriction for 6 weeks  - No lifting more than 5 pounds or lifting arm above shoulder for 6 weeks

## 2022-09-15 NOTE — PROGRESS NOTE ADULT - ATTENDING COMMENTS
s/p pacemaker implant   No complications  Stable for discharge  I interrogated pacemaker and checked thresholds manually

## 2022-09-15 NOTE — PROGRESS NOTE ADULT - SUBJECTIVE AND OBJECTIVE BOX
Chief complaint: Patient is a 77y old  Female who presents with a chief complaint of CHB (14 Sep 2022 22:32)    Interval history:  PPM placed today   Fu am cxray  Fu am interrogation    Review of systems: A complete 10-point review of systems was obtained and is negative except as stated in the interval history.    Vitals:  T(F): 96.5, Max: 98.6 (09-14 @ 04:20)  HR: 60 (60 - 78)  BP: 139/65 (112/54 - 139/65)  RR: 18 (18 - 18)  SpO2: 98% (98% - 98%)    Ins & outs:     09-14 @ 07:01  -  09-15 @ 00:22  --------------------------------------------------------  IN: 450 mL / OUT: 0 mL / NET: 450 mL      Weight trend:  Weight (kg): 64.7 (09-14)    Physical exam:  General: No apparent distress  HEENT: Anicteric sclera. Moist mucous membranes. no jvp  Cardiac: Regular rate and rhythm. No murmurs, rubs, or gallops.   Vascular: Symmetric radial pulses. Dorsalis pedis pulses palpable.   Respiratory: Normal effort. Bibasilar crackles. Clear to ascultation.   Abdomen: Soft, nontender. Audible bowel sounds.   Extremities: Warm with no edema. No cyanosis or clubbing. PPM left CW  Skin: Warm and dry. No rash.   Neurologic: Grossly normal motor function.   Psychiatric: Oriented to person, place, and time.     Data reviewed:  - Telemetry:  67-82  - ECG < from: 12 Lead ECG (09.13.22 @ 13:41) >    Diagnosis Line Normal sinus rhythm  Right bundle branch block  Abnormal ECG    < end of copied text >  (date***):   - TTE (date***):   - Chest x-ray 9/14/22  1.  Interval placement of a dual chamber left sided pacemaker with leads   projecting over the right atrium and right ventricle.  2.  No radiographic evidence of acute pulmonary disease.    - Stress test:   - CCTA:  - Cardiac catheterization:  - Cardiac MRI:    - Labs:                        12.4   7.93  )-----------( 259      ( 14 Sep 2022 05:30 )             35.4     09-14    139  |  99  |  16  ----------------------------<  148<H>  3.5   |  30  |  0.9    Ca    9.0      14 Sep 2022 18:02    TPro  6.4  /  Alb  4.3  /  TBili  0.4  /  DBili  x   /  AST  20  /  ALT  11  /  AlkPhos  86  09-13    PT/INR - ( 13 Sep 2022 15:08 )   PT: 11.30 sec;   INR: 0.98 ratio         PTT - ( 13 Sep 2022 15:08 )  PTT:24.5 sec  Troponin T, Serum: <0.01 ng/mL (09-13-22 @ 15:08)    Serum Pro-Brain Natriuretic Peptide: 32 pg/mL (09-13)  Serum Pro-Brain Natriuretic Peptide: 26 pg/mL (08-27)        Thyroid Stimulating Hormone, Serum: 2.28 uIU/mL (09-14-22 @ 10:55)        Medications:  acetaminophen   IVPB .. 1000 milliGRAM(s) IV Intermittent once  amLODIPine   Tablet 5 milliGRAM(s) Oral daily  atorvastatin 80 milliGRAM(s) Oral at bedtime  ceFAZolin   IVPB 1000 milliGRAM(s) IV Intermittent every 8 hours  ceFAZolin   IVPB 2000 milliGRAM(s) IV Intermittent once  ceFAZolin  Injectable. 2000 milliGRAM(s) IV Push once  donepezil 10 milliGRAM(s) Oral <User Schedule>  hydrochlorothiazide 25 milliGRAM(s) Oral daily  influenza  Vaccine (HIGH DOSE) 0.7 milliLiter(s) IntraMuscular once  potassium chloride    Tablet ER 20 milliEquivalent(s) Oral every 2 hours  potassium chloride    Tablet ER 10 milliEquivalent(s) Oral daily    Drips:  lactated ringers. 1000 milliLiter(s) (75 mL/Hr) IV Continuous <Continuous>    PRN:     Allergies    No Known Allergies    Intolerances      Assessment:    Patient is a 77 year old female found to have episodes of 3rd degree AVB on ILR interrogation, admitted to [facility] for PPM placement.     Plan:  # CHB  Admit to [facility]  PPM Placed today with Dr. Slim TORRESO past MN until am cxray/ interrog  fu CXray 9/15 am  fu Interrogation 9/15 am   TSH 2.28, Free T4-1.3  K 3.5 - repleted 60meq     Please contact me with any questions or concerns at [phone].
HPI:  Patient is a 77 year old female with PMHx of HTN, PVD, Syncope in January 2022 who had ILR at that time was referred to [facility] ED for abnormal ILR interrogation. Patient reports SOB on exertion with increased fatigue and dizziness intermittently for the past week. No chest pain, orthopnea, palpitations or edema. ILR was interrogated and showed sinus pauses with periods of 3rd degree AVB and showed referred for PPM implantation In ED VSS and 12 Lead EKG showed SR with RBBB and NSST changes.    Nuc 8/28/22 No Ischemia LVEF 79%  BESSY 3/15 LVEF 60%, Mild MR, Mod AR (13 Sep 2022 20:02)    She underwent PPM placement  ([facility] ) for complete/High degree AV  heart block. Procedure was successfully completed by Dr. Smalls on  9/14    SUBJECTIVE:    PAST MEDICAL & SURGICAL HISTORY  H/O: hypertension    Mitral regurgitation    S/P hysterectomy        ALLERGIES:  No Known Allergies      MEDICATIONS:  MEDICATIONS  (STANDING):  acetaminophen   IVPB .. 1000 milliGRAM(s) IV Intermittent once  amLODIPine   Tablet 5 milliGRAM(s) Oral daily  atorvastatin 80 milliGRAM(s) Oral at bedtime  ceFAZolin   IVPB 2000 milliGRAM(s) IV Intermittent once  ceFAZolin  Injectable. 2000 milliGRAM(s) IV Push once  donepezil 10 milliGRAM(s) Oral <User Schedule>  hydrochlorothiazide 25 milliGRAM(s) Oral daily  influenza  Vaccine (HIGH DOSE) 0.7 milliLiter(s) IntraMuscular once  lactated ringers. 1000 milliLiter(s) (75 mL/Hr) IV Continuous <Continuous>  potassium chloride    Tablet ER 10 milliEquivalent(s) Oral daily    MEDICATIONS  (PRN):  acetaminophen     Tablet .. 650 milliGRAM(s) Oral every 6 hours PRN Mild Pain (1 - 3)      HOME MEDICATIONS:  Home Medications:  donepezil 23 mg oral tablet: 1 tab(s) orally once a day (at bedtime) (14 Sep 2022 22:47)  hydroCHLOROthiazide 25 mg oral tablet: 1 tab(s) orally once a day (14 Sep 2022 17:28)  Norvasc 5 mg oral tablet: 1 tab(s) orally once a day (14 Sep 2022 17:28)  potassium chloride 10 mEq oral tablet, extended release: 1 tab(s) orally once a day (14 Sep 2022 17:28)  simvastatin 20 mg oral tablet: 1 tab(s) orally once a day (at bedtime) (14 Sep 2022 17:28)  Zoloft 100 mg oral tablet: 1 tab(s) orally once a day (14 Sep 2022 17:28)      VITALS:   T(F): 96.5 (09-15 @ 05:14), Max: 98.6 (09-14 @ 04:20)  HR: 66 (09-15 @ 09:00) (60 - 82)  BP: 140/61 (09-15 @ 09:00) (112/54 - 175/74)  BP(mean): 91 (09-13 @ 20:02) (91 - 91)  RR: 18 (09-15 @ 05:14) (14 - 18)  SpO2: 98% (09-14 @ 14:55) (95% - 98%)    I&O's Summary    14 Sep 2022 07:01  -  15 Sep 2022 07:00  --------------------------------------------------------  IN: 750 mL / OUT: 250 mL / NET: 500 mL    15 Sep 2022 07:01  -  15 Sep 2022 12:29  --------------------------------------------------------  IN: 0 mL / OUT: 700 mL / NET: -700 mL          PHYSICAL EXAM:  NEURO: patient is awake , alert and oriented  GEN: Not in acute distress  NECK: no thyroid enlargement, no JVD  LUNGS: Clear to auscultation bilaterally   CARDIOVASCULAR: S1/S2 present, RRR , no murmurs or rubs, no carotid bruits,  + PP bilaterally  ABD: Soft, non-tender, non-distended, +BS  EXT: No ARLEY  SKIN: L chest incision site clean dry and intact    LABS:                        11.9   6.55  )-----------( 216      ( 15 Sep 2022 07:26 )             35.2     09-15    141  |  106  |  10  ----------------------------<  92  4.4   |  25  |  0.7    Ca    9.1      15 Sep 2022 07:26  Mg     2.1     09-15    TPro  5.5<L>  /  Alb  3.7  /  TBili  0.4  /  DBili  x   /  AST  46<H>  /  ALT  22  /  AlkPhos  118<H>  09-15    PT/INR - ( 13 Sep 2022 15:08 )   PT: 11.30 sec;   INR: 0.98 ratio         PTT - ( 13 Sep 2022 15:08 )  PTT:24.5 sec    CARDIAC MARKERS ( 13 Sep 2022 15:08 )  x     / <0.01 ng/mL / x     / x     / x            Troponin trend:    Serum Pro-Brain Natriuretic Peptide: 32 pg/mL (09-13-22 @ 15:08)          RADIOLOGY:  -CXR:  -TTE:  -CCTA:  -STRESS TEST:  -CATHETERIZATION:    ECG:    TELEMETRY EVENTS:

## 2022-09-15 NOTE — DISCHARGE NOTE NURSING/CASE MANAGEMENT/SOCIAL WORK - PATIENT PORTAL LINK FT
You can access the FollowMyHealth Patient Portal offered by [facility] by registering at the following website: [email or web address]. By joining [employer]’s FollowMyHealth portal, you will also be able to view your health information using other applications (apps) compatible with our system.

## 2022-09-15 NOTE — DISCHARGE NOTE NURSING/CASE MANAGEMENT/SOCIAL WORK - NSDCPEFALRISK_GEN_ALL_CORE
For information on Fall & Injury Prevention, visit: [email or web address] OR  [email or web address] OR  https://www.cdc.gov/steadi/patient.html

## 2022-09-15 NOTE — PHYSICAL THERAPY INITIAL EVALUATION ADULT - SPECIFY REASON(S)
Discussed pt with MD Cortes ext [phone], pt requires updated activity orders and WB clarification, PT to eval with appropriate orders placed.

## 2022-09-22 DIAGNOSIS — I44.2 ATRIOVENTRICULAR BLOCK, COMPLETE: ICD-10-CM

## 2022-09-22 DIAGNOSIS — F03.90 UNSPECIFIED DEMENTIA WITHOUT BEHAVIORAL DISTURBANCE: ICD-10-CM

## 2022-09-22 DIAGNOSIS — I10 ESSENTIAL (PRIMARY) HYPERTENSION: ICD-10-CM

## 2022-09-22 DIAGNOSIS — I73.9 PERIPHERAL VASCULAR DISEASE, UNSPECIFIED: ICD-10-CM

## 2022-09-26 ENCOUNTER — APPOINTMENT (OUTPATIENT)
Dept: CARDIOLOGY | Facility: CLINIC | Age: 77
End: 2022-09-26

## 2022-10-19 ENCOUNTER — APPOINTMENT (OUTPATIENT)
Dept: CARDIOLOGY | Facility: CLINIC | Age: 77
End: 2022-10-19

## 2022-10-19 VITALS — TEMPERATURE: 97.9 F | HEIGHT: 64 IN | SYSTOLIC BLOOD PRESSURE: 136 MMHG | DIASTOLIC BLOOD PRESSURE: 71 MMHG

## 2022-10-19 VITALS
WEIGHT: 137.3 LBS | DIASTOLIC BLOOD PRESSURE: 71 MMHG | TEMPERATURE: 97.9 F | HEIGHT: 64 IN | HEART RATE: 94 BPM | BODY MASS INDEX: 23.44 KG/M2 | SYSTOLIC BLOOD PRESSURE: 136 MMHG

## 2022-10-19 DIAGNOSIS — Z48.89 ENCOUNTER FOR OTHER SPECIFIED SURGICAL AFTERCARE: ICD-10-CM

## 2022-10-19 PROCEDURE — 99024 POSTOP FOLLOW-UP VISIT: CPT

## 2022-10-19 PROCEDURE — 93280 PM DEVICE PROGR EVAL DUAL: CPT

## 2022-10-19 PROCEDURE — 93000 ELECTROCARDIOGRAM COMPLETE: CPT | Mod: 59

## 2022-10-19 RX ORDER — IBUPROFEN 800 MG/1
800 TABLET ORAL 3 TIMES DAILY
Refills: 0 | Status: DISCONTINUED | COMMUNITY
End: 2022-10-19

## 2022-10-19 RX ORDER — SERTRALINE HYDROCHLORIDE 100 MG/1
100 TABLET, FILM COATED ORAL
Qty: 90 | Refills: 0 | Status: COMPLETED | COMMUNITY
Start: 2021-10-25 | End: 2022-10-19

## 2022-10-19 RX ORDER — SERTRALINE 25 MG/1
25 TABLET, FILM COATED ORAL
Qty: 90 | Refills: 0 | Status: DISCONTINUED | COMMUNITY
Start: 2022-08-18 | End: 2022-10-19

## 2022-10-19 RX ORDER — ROSUVASTATIN CALCIUM 20 MG/1
20 TABLET, FILM COATED ORAL
Qty: 90 | Refills: 0 | Status: COMPLETED | COMMUNITY
Start: 2021-10-25 | End: 2022-10-19

## 2022-10-21 ENCOUNTER — NON-APPOINTMENT (OUTPATIENT)
Age: 77
End: 2022-10-21

## 2022-10-23 NOTE — PROCEDURE
[Pacemaker] : pacemaker [DDD] : DDD [Voltage: ___ volts] : Voltage was [unfilled] volts [Magnet Rate: ___ Ppm] : magnet rate was [unfilled] Ppm [Longevity: ___ months] : The estimated remaining battery life is [unfilled] months [Lead Imp:  ___ohms] : lead impedance was [unfilled] ohms [Sensing Amplitude ___mv] : sensing amplitude was [unfilled] mv [___V @] : [unfilled] V [___ ms] : [unfilled] ms [Apace-Vsense ___ %] : Apace-Vsense [unfilled]% [No] : not [NSR] : normal sinus rhythm [de-identified] : [id number] [de-identified] : [facility] [de-identified] : [id number] [de-identified] : 09/14/2022 [de-identified] : 60 [de-identified] : No New Episodes\par AP 16%\par  <1%

## 2022-10-23 NOTE — CARDIOLOGY SUMMARY
[de-identified] : ECG ( 10/19/2022) 98bpm sinus rhythm with RBBB  NV 180ms, QRS 132ms, QTC 459ms [de-identified] : 4/21/2022  LVEF 55-60%%, normal LA/RA size. moderate to severe mitral valve regurg

## 2022-10-23 NOTE — ASSESSMENT
[FreeTextEntry1] : 77 year old female s/p dual chamber pacemaker implant on September 14th for complete heart block with near syncope.\par  She presents today for wound check and device interrogation.\par # Her incision site is completely healed and there is no redness\par # Device interrogation has normal parameters and there were no events.\par  #She is enrolled in remote monitoring and she is transmitting appropriately\par Discussed with the  schedule and process of remote monitoring,presence and  availability of remote team.I also mentioned that device doesn't monitor if patient is having a heart attack, if they have symptoms/cardiac event they should not wait for a call from remote team, but proceed to ED.\par I added that they will received bills for remote monitoring for co-payment that is not covered by their insurance.\par \par . Patient will return to the office in 6 months time. Remote monitoring was discussed at length. \par \par Off note: Patient has runny nose and feeling congested during the visit She has no fever. Recommend to see her PCP, but she said she hasn't seen him a long time.\par I proposed to see a PCP next door, which she said she will walk there today to register for an appt.\par \par

## 2022-10-23 NOTE — ADDENDUM
[FreeTextEntry1] : IAugust assisted in documentation on 10/19/2022 acting as a scribe for Xuan Wood NP.

## 2022-10-23 NOTE — HISTORY OF PRESENT ILLNESS
[FreeTextEntry1] : Referring Internist: Dr. Saleem Waters\par \par January 2022; ER after collapsing while in a supermarket. She tells me she became weak and started falling to the ground but was prevented from hitting fall by bystanders. CT head negative. Trops negative. Ortho \par statics positive in ED, received IV fluids. Repeat orthostatics morning after again positive. Will repeat orthostatics in one day. EKG showed; bifascicular block RBBB, LAFB and borderline 1st degree AV block. EP was consulted and placed loop; Echo shows mod-severe mitral regurgitation. 60% EF.\par \par Patient is 77 years old with hypertension, carotid artery disease less than 50%, history of syncope in January 2022, received loop recorder and was found to have pause on her remote. Patient was found to have pauses on the loop recorder as well as complete AV block. She was sent to the ER and a dual chamber pacemaker was implanted on September 14th. She presents today for wound check as well as device interrogation.

## 2022-10-23 NOTE — PHYSICAL EXAM
[Well Developed] : well developed [Well Nourished] : well nourished [No Acute Distress] : no acute distress [Normal Conjunctiva] : normal conjunctiva [Normal Venous Pressure] : normal venous pressure [No Carotid Bruit] : no carotid bruit [Normal S1, S2] : normal S1, S2 [No Murmur] : no murmur [No Rub] : no rub [No Gallop] : no gallop [Clear Lung Fields] : clear lung fields [Good Air Entry] : good air entry [No Respiratory Distress] : no respiratory distress  [Soft] : abdomen soft [Non Tender] : non-tender [No Masses/organomegaly] : no masses/organomegaly [Normal Bowel Sounds] : normal bowel sounds [Normal Gait] : normal gait [No Edema] : no edema [No Cyanosis] : no cyanosis [No Clubbing] : no clubbing [No Varicosities] : no varicosities [No Rash] : no rash [No Skin Lesions] : no skin lesions [Moves all extremities] : moves all extremities [No Focal Deficits] : no focal deficits [Normal Speech] : normal speech [Alert and Oriented] : alert and oriented [Normal memory] : normal memory [Normal Appearance] : normal appearance [General Appearance - Well Nourished] : well nourished [No Deformities] : no deformities [General Appearance - In No Acute Distress] : no acute distress [Heart Rate And Rhythm] : heart rate and rhythm were normal [Heart Sounds] : normal S1 and S2 [] : no respiratory distress [Respiration, Rhythm And Depth] : normal respiratory rhythm and effort [Left Infraclavicular] : left infraclavicular area [Clean] : clean [Dry] : dry [Well-Healed] : well-healed [Abdomen Soft] : soft [de-identified] : small 1x1 cm cervical lymph node palpable left side

## 2022-10-31 ENCOUNTER — APPOINTMENT (OUTPATIENT)
Dept: CARDIOLOGY | Facility: CLINIC | Age: 77
End: 2022-10-31

## 2023-01-18 ENCOUNTER — APPOINTMENT (OUTPATIENT)
Dept: CARDIOLOGY | Facility: CLINIC | Age: 78
End: 2023-01-18
Payer: MEDICARE

## 2023-01-18 ENCOUNTER — NON-APPOINTMENT (OUTPATIENT)
Age: 78
End: 2023-01-18

## 2023-01-18 PROCEDURE — 93296 REM INTERROG EVL PM/IDS: CPT

## 2023-01-18 PROCEDURE — 93294 REM INTERROG EVL PM/LDLS PM: CPT

## 2023-03-13 NOTE — ED PROVIDER NOTE - WR INTERPRETED BY 1
Santi Glez
Additional Notes: He has been off of Taltz for 2 months and he is still clear on the body throughout.  He is looking to enroll in the  and they are wanting him off of this medication to see how he does.  Discussed that if he has a flare he will call and I can prescribe something topically that he would be able to continue when enlisted.
Render Risk Assessment In Note?: no
Detail Level: Detailed

## 2023-04-07 ENCOUNTER — EMERGENCY (EMERGENCY)
Facility: HOSPITAL | Age: 78
LOS: 0 days | Discharge: HOME | End: 2023-04-07
Attending: EMERGENCY MEDICINE
Payer: MEDICARE

## 2023-04-07 VITALS
TEMPERATURE: 99 F | DIASTOLIC BLOOD PRESSURE: 69 MMHG | SYSTOLIC BLOOD PRESSURE: 134 MMHG | OXYGEN SATURATION: 99 % | RESPIRATION RATE: 18 BRPM | WEIGHT: 139.99 LBS | HEART RATE: 61 BPM

## 2023-04-07 VITALS — TEMPERATURE: 98 F

## 2023-04-07 DIAGNOSIS — R19.7 DIARRHEA, UNSPECIFIED: ICD-10-CM

## 2023-04-07 DIAGNOSIS — Z86.79 PERSONAL HISTORY OF OTHER DISEASES OF THE CIRCULATORY SYSTEM: ICD-10-CM

## 2023-04-07 DIAGNOSIS — R10.13 EPIGASTRIC PAIN: ICD-10-CM

## 2023-04-07 DIAGNOSIS — Z90.710 ACQUIRED ABSENCE OF BOTH CERVIX AND UTERUS: Chronic | ICD-10-CM

## 2023-04-07 DIAGNOSIS — Z95.0 PRESENCE OF CARDIAC PACEMAKER: ICD-10-CM

## 2023-04-07 DIAGNOSIS — Z87.891 PERSONAL HISTORY OF NICOTINE DEPENDENCE: ICD-10-CM

## 2023-04-07 DIAGNOSIS — R11.0 NAUSEA: ICD-10-CM

## 2023-04-07 DIAGNOSIS — F02.80 DEMENTIA IN OTHER DISEASES CLASSIFIED ELSEWHERE, UNSPECIFIED SEVERITY, WITHOUT BEHAVIORAL DISTURBANCE, PSYCHOTIC DISTURBANCE, MOOD DISTURBANCE, AND ANXIETY: ICD-10-CM

## 2023-04-07 DIAGNOSIS — I10 ESSENTIAL (PRIMARY) HYPERTENSION: ICD-10-CM

## 2023-04-07 DIAGNOSIS — G30.9 ALZHEIMER'S DISEASE, UNSPECIFIED: ICD-10-CM

## 2023-04-07 DIAGNOSIS — R94.5 ABNORMAL RESULTS OF LIVER FUNCTION STUDIES: ICD-10-CM

## 2023-04-07 DIAGNOSIS — Z90.710 ACQUIRED ABSENCE OF BOTH CERVIX AND UTERUS: ICD-10-CM

## 2023-04-07 DIAGNOSIS — Z20.822 CONTACT WITH AND (SUSPECTED) EXPOSURE TO COVID-19: ICD-10-CM

## 2023-04-07 LAB
ALBUMIN SERPL ELPH-MCNC: 5 G/DL — SIGNIFICANT CHANGE UP (ref 3.5–5.2)
ALP SERPL-CCNC: 150 U/L — HIGH (ref 30–115)
ALT FLD-CCNC: 49 U/L — HIGH (ref 0–41)
ANION GAP SERPL CALC-SCNC: 20 MMOL/L — HIGH (ref 7–14)
AST SERPL-CCNC: 131 U/L — HIGH (ref 0–41)
BASE EXCESS BLDV CALC-SCNC: 2.4 MMOL/L — SIGNIFICANT CHANGE UP (ref -2–3)
BASOPHILS # BLD AUTO: 0.02 K/UL — SIGNIFICANT CHANGE UP (ref 0–0.2)
BASOPHILS NFR BLD AUTO: 0.4 % — SIGNIFICANT CHANGE UP (ref 0–1)
BILIRUB DIRECT SERPL-MCNC: 0.7 MG/DL — HIGH (ref 0–0.3)
BILIRUB INDIRECT FLD-MCNC: 0.9 MG/DL — SIGNIFICANT CHANGE UP (ref 0.2–1.2)
BILIRUB SERPL-MCNC: 1.6 MG/DL — HIGH (ref 0.2–1.2)
BUN SERPL-MCNC: 27 MG/DL — HIGH (ref 10–20)
CA-I SERPL-SCNC: 1.2 MMOL/L — SIGNIFICANT CHANGE UP (ref 1.15–1.33)
CALCIUM SERPL-MCNC: 10.4 MG/DL — SIGNIFICANT CHANGE UP (ref 8.4–10.5)
CHLORIDE SERPL-SCNC: 94 MMOL/L — LOW (ref 98–110)
CO2 SERPL-SCNC: 23 MMOL/L — SIGNIFICANT CHANGE UP (ref 17–32)
CREAT SERPL-MCNC: 1.1 MG/DL — SIGNIFICANT CHANGE UP (ref 0.7–1.5)
EGFR: 51 ML/MIN/1.73M2 — LOW
EOSINOPHIL # BLD AUTO: 0.07 K/UL — SIGNIFICANT CHANGE UP (ref 0–0.7)
EOSINOPHIL NFR BLD AUTO: 1.2 % — SIGNIFICANT CHANGE UP (ref 0–8)
GAS PNL BLDV: 135 MMOL/L — LOW (ref 136–145)
GAS PNL BLDV: SIGNIFICANT CHANGE UP
GLUCOSE SERPL-MCNC: 94 MG/DL — SIGNIFICANT CHANGE UP (ref 70–99)
HCO3 BLDV-SCNC: 29 MMOL/L — SIGNIFICANT CHANGE UP (ref 22–29)
HCT VFR BLD CALC: 42.6 % — SIGNIFICANT CHANGE UP (ref 37–47)
HCT VFR BLDA CALC: 38 % — LOW (ref 39–51)
HGB BLD CALC-MCNC: 12.7 G/DL — SIGNIFICANT CHANGE UP (ref 12.6–17.4)
HGB BLD-MCNC: 14.4 G/DL — SIGNIFICANT CHANGE UP (ref 12–16)
IMM GRANULOCYTES NFR BLD AUTO: 0.2 % — SIGNIFICANT CHANGE UP (ref 0.1–0.3)
LACTATE BLDV-MCNC: 1.6 MMOL/L — SIGNIFICANT CHANGE UP (ref 0.5–2)
LACTATE SERPL-SCNC: 3 MMOL/L — HIGH (ref 0.7–2)
LIDOCAIN IGE QN: 96 U/L — HIGH (ref 7–60)
LYMPHOCYTES # BLD AUTO: 1.48 K/UL — SIGNIFICANT CHANGE UP (ref 1.2–3.4)
LYMPHOCYTES # BLD AUTO: 26.1 % — SIGNIFICANT CHANGE UP (ref 20.5–51.1)
MAGNESIUM SERPL-MCNC: 1.9 MG/DL — SIGNIFICANT CHANGE UP (ref 1.8–2.4)
MCHC RBC-ENTMCNC: 29.1 PG — SIGNIFICANT CHANGE UP (ref 27–31)
MCHC RBC-ENTMCNC: 33.8 G/DL — SIGNIFICANT CHANGE UP (ref 32–37)
MCV RBC AUTO: 86.2 FL — SIGNIFICANT CHANGE UP (ref 81–99)
MONOCYTES # BLD AUTO: 0.28 K/UL — SIGNIFICANT CHANGE UP (ref 0.1–0.6)
MONOCYTES NFR BLD AUTO: 4.9 % — SIGNIFICANT CHANGE UP (ref 1.7–9.3)
NEUTROPHILS # BLD AUTO: 3.81 K/UL — SIGNIFICANT CHANGE UP (ref 1.4–6.5)
NEUTROPHILS NFR BLD AUTO: 67.2 % — SIGNIFICANT CHANGE UP (ref 42.2–75.2)
NRBC # BLD: 0 /100 WBCS — SIGNIFICANT CHANGE UP (ref 0–0)
PCO2 BLDV: 51 MMHG — HIGH (ref 39–42)
PH BLDV: 7.36 — SIGNIFICANT CHANGE UP (ref 7.32–7.43)
PLATELET # BLD AUTO: 241 K/UL — SIGNIFICANT CHANGE UP (ref 130–400)
PO2 BLDV: 21 MMHG — SIGNIFICANT CHANGE UP
POTASSIUM BLDV-SCNC: 2.8 MMOL/L — CRITICAL LOW (ref 3.5–5.1)
POTASSIUM SERPL-MCNC: 3 MMOL/L — LOW (ref 3.5–5)
POTASSIUM SERPL-SCNC: 3 MMOL/L — LOW (ref 3.5–5)
PROT SERPL-MCNC: 7.3 G/DL — SIGNIFICANT CHANGE UP (ref 6–8)
RBC # BLD: 4.94 M/UL — SIGNIFICANT CHANGE UP (ref 4.2–5.4)
RBC # FLD: 12.3 % — SIGNIFICANT CHANGE UP (ref 11.5–14.5)
SAO2 % BLDV: 36.7 % — SIGNIFICANT CHANGE UP
SARS-COV-2 RNA SPEC QL NAA+PROBE: SIGNIFICANT CHANGE UP
SODIUM SERPL-SCNC: 137 MMOL/L — SIGNIFICANT CHANGE UP (ref 135–146)
TROPONIN T SERPL-MCNC: <0.01 NG/ML — SIGNIFICANT CHANGE UP
WBC # BLD: 5.67 K/UL — SIGNIFICANT CHANGE UP (ref 4.8–10.8)
WBC # FLD AUTO: 5.67 K/UL — SIGNIFICANT CHANGE UP (ref 4.8–10.8)

## 2023-04-07 PROCEDURE — 93005 ELECTROCARDIOGRAM TRACING: CPT

## 2023-04-07 PROCEDURE — 83735 ASSAY OF MAGNESIUM: CPT

## 2023-04-07 PROCEDURE — 71045 X-RAY EXAM CHEST 1 VIEW: CPT | Mod: 26

## 2023-04-07 PROCEDURE — 96375 TX/PRO/DX INJ NEW DRUG ADDON: CPT | Mod: XU

## 2023-04-07 PROCEDURE — 36415 COLL VENOUS BLD VENIPUNCTURE: CPT

## 2023-04-07 PROCEDURE — 84295 ASSAY OF SERUM SODIUM: CPT

## 2023-04-07 PROCEDURE — 76705 ECHO EXAM OF ABDOMEN: CPT

## 2023-04-07 PROCEDURE — 74177 CT ABD & PELVIS W/CONTRAST: CPT | Mod: MA

## 2023-04-07 PROCEDURE — 76705 ECHO EXAM OF ABDOMEN: CPT | Mod: 26

## 2023-04-07 PROCEDURE — 80076 HEPATIC FUNCTION PANEL: CPT

## 2023-04-07 PROCEDURE — 85018 HEMOGLOBIN: CPT

## 2023-04-07 PROCEDURE — 82330 ASSAY OF CALCIUM: CPT

## 2023-04-07 PROCEDURE — 99285 EMERGENCY DEPT VISIT HI MDM: CPT

## 2023-04-07 PROCEDURE — 85025 COMPLETE CBC W/AUTO DIFF WBC: CPT

## 2023-04-07 PROCEDURE — 84132 ASSAY OF SERUM POTASSIUM: CPT

## 2023-04-07 PROCEDURE — 71275 CT ANGIOGRAPHY CHEST: CPT | Mod: 26,MA

## 2023-04-07 PROCEDURE — 71275 CT ANGIOGRAPHY CHEST: CPT | Mod: MA

## 2023-04-07 PROCEDURE — 85014 HEMATOCRIT: CPT

## 2023-04-07 PROCEDURE — 80048 BASIC METABOLIC PNL TOTAL CA: CPT

## 2023-04-07 PROCEDURE — 74177 CT ABD & PELVIS W/CONTRAST: CPT | Mod: 26,MA

## 2023-04-07 PROCEDURE — 93010 ELECTROCARDIOGRAM REPORT: CPT

## 2023-04-07 PROCEDURE — 87635 SARS-COV-2 COVID-19 AMP PRB: CPT

## 2023-04-07 PROCEDURE — 84484 ASSAY OF TROPONIN QUANT: CPT

## 2023-04-07 PROCEDURE — 82803 BLOOD GASES ANY COMBINATION: CPT

## 2023-04-07 PROCEDURE — 83605 ASSAY OF LACTIC ACID: CPT

## 2023-04-07 PROCEDURE — 99285 EMERGENCY DEPT VISIT HI MDM: CPT | Mod: 25

## 2023-04-07 PROCEDURE — 96374 THER/PROPH/DIAG INJ IV PUSH: CPT | Mod: XU

## 2023-04-07 PROCEDURE — 83690 ASSAY OF LIPASE: CPT

## 2023-04-07 PROCEDURE — 71045 X-RAY EXAM CHEST 1 VIEW: CPT

## 2023-04-07 RX ORDER — SERTRALINE 25 MG/1
1 TABLET, FILM COATED ORAL
Refills: 0 | DISCHARGE

## 2023-04-07 RX ORDER — ONDANSETRON 8 MG/1
4 TABLET, FILM COATED ORAL ONCE
Refills: 0 | Status: COMPLETED | OUTPATIENT
Start: 2023-04-07 | End: 2023-04-07

## 2023-04-07 RX ORDER — HYDROCHLOROTHIAZIDE 25 MG
1 TABLET ORAL
Qty: 0 | Refills: 0 | DISCHARGE

## 2023-04-07 RX ORDER — SIMVASTATIN 20 MG/1
1 TABLET, FILM COATED ORAL
Qty: 0 | Refills: 0 | DISCHARGE

## 2023-04-07 RX ORDER — POTASSIUM CHLORIDE 20 MEQ
40 PACKET (EA) ORAL ONCE
Refills: 0 | Status: COMPLETED | OUTPATIENT
Start: 2023-04-07 | End: 2023-04-07

## 2023-04-07 RX ORDER — SERTRALINE 25 MG/1
1 TABLET, FILM COATED ORAL
Qty: 0 | Refills: 0 | DISCHARGE

## 2023-04-07 RX ORDER — POTASSIUM CHLORIDE 20 MEQ
1 PACKET (EA) ORAL
Qty: 0 | Refills: 0 | DISCHARGE

## 2023-04-07 RX ORDER — MORPHINE SULFATE 50 MG/1
4 CAPSULE, EXTENDED RELEASE ORAL ONCE
Refills: 0 | Status: DISCONTINUED | OUTPATIENT
Start: 2023-04-07 | End: 2023-04-07

## 2023-04-07 RX ORDER — AMLODIPINE BESYLATE 2.5 MG/1
1 TABLET ORAL
Qty: 0 | Refills: 0 | DISCHARGE

## 2023-04-07 RX ORDER — SODIUM CHLORIDE 9 MG/ML
1000 INJECTION, SOLUTION INTRAVENOUS ONCE
Refills: 0 | Status: COMPLETED | OUTPATIENT
Start: 2023-04-07 | End: 2023-04-07

## 2023-04-07 RX ADMIN — MORPHINE SULFATE 4 MILLIGRAM(S): 50 CAPSULE, EXTENDED RELEASE ORAL at 16:15

## 2023-04-07 RX ADMIN — ONDANSETRON 4 MILLIGRAM(S): 8 TABLET, FILM COATED ORAL at 16:08

## 2023-04-07 RX ADMIN — MORPHINE SULFATE 4 MILLIGRAM(S): 50 CAPSULE, EXTENDED RELEASE ORAL at 16:08

## 2023-04-07 RX ADMIN — Medication 40 MILLIEQUIVALENT(S): at 18:22

## 2023-04-07 RX ADMIN — SODIUM CHLORIDE 1000 MILLILITER(S): 9 INJECTION, SOLUTION INTRAVENOUS at 17:03

## 2023-04-07 NOTE — ED PROVIDER NOTE - OBJECTIVE STATEMENT
78 year-old female with past medical history mitral regurgitation, HTN, pacemaker, alzheimer dz, presents BIBEMS for diarrhea and epigastric pain. Spoke with patient's daughter Marti who reports the patient has had multiple epis 78 year-old female with past medical history mitral regurgitation, HTN, pacemaker, alzheimer dz, presents BIBEMS for diarrhea and epigastric pain. Spoke with patient's daughter Marti who reports the patient has had multiple episodes of diarrhea over the past 3 days after a visit from her other daughter, who was recently ill with "stomach flu." Patient states she has also had epigastric discomfort since last night. Endorses nausea. Denies f/c, diaphoresis cough, vomiting/diarrhea, urinary sx. Denies history of blood clots, prolonged immobilization, recent surgery.

## 2023-04-07 NOTE — ED PROVIDER NOTE - CLINICAL SUMMARY MEDICAL DECISION MAKING FREE TEXT BOX
78-year-old female, history of HTN, dementia brought in for abdominal pain and diarrhea.  Family member had similar symptoms.  Labs noted for WBC 5.6, K 3, BUN 27, creatinine 1.1, , ALT 49, T. bili 1.6, troponin is less than 0.01.  CTA chest no PE.  CT Abdo pelvis no acute pathology.  Right upper quadrant sonogram no acute pathology, postcholecystectomy.  Patient given IV fluids, morphine, Zofran, and KCl with improvement.  Patient able to tolerate p.o.  Will DC to follow-up with PCP and GI.

## 2023-04-07 NOTE — ED PROVIDER NOTE - ATTENDING APP SHARED VISIT CONTRIBUTION OF CARE
78-year-old female pleasantly demented complains of "my heart", per daughter has been having diarrhea, on exam vitals appreciated, nontoxic-appearing playing on her iPad, conjunctive a pink, dry mucous membranes, neck supple, cor regular, lungs CTA, abdomen soft nontender, will check labs, urine, imaging as patient unreliable

## 2023-04-07 NOTE — ED PROVIDER NOTE - PHYSICAL EXAMINATION
Physical Exam    Vital Signs: I have reviewed the initial vital signs.  Constitutional: appears stated age, appears uncomfortable in stretcher  Eyes: Sclera clear, EOMI.  Cardiovascular: S1 and S2, regular rate, regular rhythm, well-perfused extremities, radial pulses equal and 2+, pedal pulses 2+ and equal  Respiratory: unlabored respiratory effort, clear to auscultation bilaterally no wheezing, rales and rhonchi  Gastrointestinal: abdomen soft, tender to palpation at left upper quadrant, no pulsatile mass, normal bowl sounds  Musculoskeletal: supple neck, no lower extremity edema, no midline tenderness  Integumentary: warm, dry, no rash  Neurologic: awake, alert, oriented x3, extremities’ motor and sensory functions grossly intact

## 2023-04-07 NOTE — ED PROVIDER NOTE - PATIENT PORTAL LINK FT
You can access the FollowMyHealth Patient Portal offered by [facility] by registering at the following website: [email or web address]. By joining [employer]’s FollowMyHealth portal, you will also be able to view your health information using other applications (apps) compatible with our system.

## 2023-04-07 NOTE — ED PROVIDER NOTE - NSFOLLOWUPINSTRUCTIONS_ED_ALL_ED_FT
Diarrhea    Diarrhea is frequent loose or watery bowel movements that has many causes. Diarrhea can make you feel weak and cause you to become dehydrated. Diarrhea typically lasts 2–3 days, but can last longer if it is a sign of something more serious. Drink clear fluids to prevent dehydration. Eat bland, easy-to-digest foods as tolerated.     SEEK IMMEDIATE MEDICAL CARE IF YOU HAVE ANY OF THE FOLLOWING SYMPTOMS: high fevers, lightheadedness/dizziness, chest pain, black or bloody stools, shortness of breath, severe abdominal or back pain, or any signs of dehydration.    **you were found to have elevated liver enzymes, follow up with GI doctor and PMD within 1 week

## 2023-04-19 ENCOUNTER — APPOINTMENT (OUTPATIENT)
Dept: ELECTROPHYSIOLOGY | Facility: CLINIC | Age: 78
End: 2023-04-19
Payer: MEDICARE

## 2023-04-19 VITALS
DIASTOLIC BLOOD PRESSURE: 74 MMHG | TEMPERATURE: 98 F | BODY MASS INDEX: 23.9 KG/M2 | HEART RATE: 72 BPM | WEIGHT: 140 LBS | SYSTOLIC BLOOD PRESSURE: 123 MMHG | HEIGHT: 64 IN

## 2023-04-19 DIAGNOSIS — Z45.018 ENCOUNTER FOR ADJUSTMENT AND MANAGEMENT OF OTHER PART OF CARDIAC PACEMAKER: ICD-10-CM

## 2023-04-19 DIAGNOSIS — Z86.59 PERSONAL HISTORY OF OTHER MENTAL AND BEHAVIORAL DISORDERS: ICD-10-CM

## 2023-04-19 DIAGNOSIS — I49.5 SICK SINUS SYNDROME: ICD-10-CM

## 2023-04-19 DIAGNOSIS — R55 SYNCOPE AND COLLAPSE: ICD-10-CM

## 2023-04-19 PROCEDURE — 93280 PM DEVICE PROGR EVAL DUAL: CPT

## 2023-04-19 PROCEDURE — 93000 ELECTROCARDIOGRAM COMPLETE: CPT | Mod: 59

## 2023-04-19 RX ORDER — SERTRALINE 25 MG/1
25 TABLET, FILM COATED ORAL DAILY
Refills: 0 | Status: ACTIVE | COMMUNITY

## 2023-04-19 RX ORDER — DONEPEZIL HYDROCHLORIDE 23 MG/1
23 TABLET, FILM COATED ORAL DAILY
Refills: 0 | Status: ACTIVE | COMMUNITY
Start: 2021-10-27

## 2023-04-19 RX ORDER — HYDROCHLOROTHIAZIDE 25 MG/1
25 TABLET ORAL DAILY
Refills: 0 | Status: ACTIVE | COMMUNITY
Start: 2021-09-28

## 2023-04-19 RX ORDER — POTASSIUM CHLORIDE 750 MG/1
10 TABLET, FILM COATED, EXTENDED RELEASE ORAL DAILY
Refills: 0 | Status: ACTIVE | COMMUNITY
Start: 2021-10-25

## 2023-04-19 RX ORDER — AMLODIPINE BESYLATE 5 MG/1
5 TABLET ORAL DAILY
Refills: 0 | Status: ACTIVE | COMMUNITY
Start: 2021-12-05

## 2023-04-19 RX ORDER — IBUPROFEN 200 MG/1
CAPSULE, LIQUID FILLED ORAL
Refills: 0 | Status: COMPLETED | COMMUNITY
End: 2023-04-19

## 2023-04-19 NOTE — ASSESSMENT
[FreeTextEntry1] : 78 year old female s/p dual chamber pacemaker implant on September 14th for complete heart block with near syncope.\par  \par # Device interrogation has normal parameters and there were no events.\par  #She is enrolled in remote monitoring and she is transmitting appropriately\par # BP is stable.\par \par \par . Patient will return to the office in 12 months time. Remote monitoring was discussed at length. \par \par Patient report of not being able to sleep well, She also admits of worsening forgetfullness.\par I suggested for her to do more exercise like walking, engaged with some kind of a hobby, she does crocheting before. I suggested also meeting with friends and maybe join some clubs to keep her occupied. She said she lives\par alone. I see patient is on donezepil and zoloft. which she doesn't remember who prescribed her.\par I asked to have a follow up with her PCP.\par Admits family lives in  and 1 in [place] - she came in today by [employer] set up by daughter.\par \par \par

## 2023-04-19 NOTE — HISTORY OF PRESENT ILLNESS
[FreeTextEntry1] : Referring Internist: Dr. Saleem Waters\par \par \par Patient is 78 years old with hypertension, carotid artery disease less than 50%,dementia, depression, history of syncope in January 2022, received loop recorder. Patient was found to have pauses on the loop recorder as well as complete AV block. She was sent to the ER and a dual chamber pacemaker was implanted on September 14th, 2022. Echo shows mod-severe mitral regurgitation. 60% EF.\par

## 2023-04-19 NOTE — PROCEDURE
[No] : not [NSR] : normal sinus rhythm [Pacemaker] : pacemaker [DDD] : DDD [Magnet Rate: ___ Ppm] : magnet rate was [unfilled] Ppm [___ ms] : [unfilled] ms [Apace-Vsense ___ %] : Apace-Vsense [unfilled]% [Voltage: ___ volts] : Voltage was [unfilled] volts [Longevity: ___ months] : The estimated remaining battery life is [unfilled] months [Threshold Testing Performed] : Threshold testing was performed [Lead Imp:  ___ohms] : lead impedance was [unfilled] ohms [Sensing Amplitude ___mv] : sensing amplitude was [unfilled] mv [___V @] : [unfilled] V [Programmed for Longevity] : output reprogrammed for improved battery longevity [de-identified] : [facility] [de-identified] : [id number] [de-identified] : [id number] [de-identified] : 09/14/2022 [de-identified] : 60 [de-identified] : No New Episodes\par AP 19%\par  2.3%

## 2023-04-19 NOTE — CARDIOLOGY SUMMARY
[de-identified] : ECG 4/19/2023 72 bpm sinus rhythm < RBBB, ND 184ms, QRS 136ms, QTC 448ms\par ECG ( 10/19/2022) 98bpm sinus rhythm with RBBB  ND 180ms, QRS 132ms, QTC 459ms [de-identified] : 4/21/2022  LVEF 55-60%%, normal LA/RA size. moderate to severe mitral valve regurg [de-identified] : 9/14/2022 Abbott Dual chamber PPM

## 2023-04-19 NOTE — PHYSICAL EXAM
[Normal Appearance] : normal appearance [General Appearance - Well Nourished] : well nourished [No Deformities] : no deformities [General Appearance - In No Acute Distress] : no acute distress [Heart Rate And Rhythm] : heart rate and rhythm were normal [Heart Sounds] : normal S1 and S2 [] : no respiratory distress [Respiration, Rhythm And Depth] : normal respiratory rhythm and effort [Left Infraclavicular] : left infraclavicular area [Clean] : clean [Dry] : dry [Well-Healed] : well-healed [Abdomen Soft] : soft

## 2023-05-09 NOTE — DISCHARGE NOTE PROVIDER - NSDCQMSTROKE_NEU_ALL_CORE
ED Attending Physician Note      Patient : Elizabet Nayak Age: 67 year old Sex: female   MRN: [id number] Encounter Date: 5/9/2023      History         Chief Complaint   Patient presents with   • Chest Pain Adult       As is customary patient seen and evaluated with resident please see their note for full history of present illness details.    HPI: 67 year old female presents with past medical history as below presents for evaluation of chest pain, she ate breakfast this morning consisting of roberts and potato salad and developed retrosternal chest pain and tightness 7 out of 10 worse with exertion seem to be resolved with rest and after being given an aspirin by EMS.  Also having associated increased episodes of belching which have persisted.  No abdominal pain no burning sensation consistent with GERD though she does have a history of this no shortness of breath diaphoresis nausea vomiting fevers or chills.  No dizziness or lightheadedness.    Patient had surgery here recently hernias I have reviewed the operation note from 4/24/2023.    No Known Allergies    Current Discharge Medication List      Prior to Admission Medications    Details   amitriptyline (ELAVIL) 25 MG tablet Take 1 tablet by mouth at bedtime.  Qty: 90 tablet, Refills: 1      HYDROcodone-acetaminophen (NORCO) 5-325 MG per tablet Take 1 tablet by mouth every 6 hours as needed for Pain.  Qty: 30 tablet, Refills: 0      Calcium-Magnesium-Vitamin D (CALCIUM MAGNESIUM PO)       ZINC SULFATE PO       amLODIPine (NORVASC) 10 MG tablet Take 1 tablet by mouth daily.  Qty: 90 tablet, Refills: 1      atorvastatin (LIPITOR) 20 MG tablet Take 1 tablet by mouth daily.  Qty: 90 tablet, Refills: 1      omeprazole (PriLOSEC) 40 MG capsule Take 1 capsule by mouth daily.  Qty: 90 capsule, Refills: 1      fluticasone (FLONASE) 50 MCG/ACT nasal spray Spray 2 sprays in each nostril daily. SHAKE LIQUID  Qty: 48 g, Refills: 3             Current Discharge Medication List           Past Medical History:   Diagnosis Date   • COVID-19 vaccine administered     Pfizer with booster   • Depressive disorder    • Elevated TSH 11/04/2019   • Encounter for screening colonoscopy 03/01/2022   • Essential (primary) hypertension    • Hearing problem 11/01/2019    Russell-RN, [place]:left ear   • Lumbar stenosis 11/01/2019   • Myalgia, unspecified site 06/05/2015   • Nasal congestion with rhinorrhea 07/10/2015   • Use of proton pump inhibitor therapy 10/20/2017       Past Surgical History:   Procedure Laterality Date   • BACK SURGERY     • COLONOSCOPY     • ORAL SURGERY PROCEDURE     • TONSILLECTOMY         Family History   Problem Relation Age of Onset   • Heart disease Mother    • Cancer, Lung Father    • Cancer Father         brain   • Hypertension Father    • Diabetes Brother        Social History     Tobacco Use   • Smoking status: Every Day     Current packs/day: 0.50     Average packs/day: 0.5 packs/day for 30.0 years (15.0 pk-yrs)     Types: Cigarettes   • Smokeless tobacco: Never   • Tobacco comments:     8cig/day   Vaping Use   • Vaping status: never used     Passive vaping exposure: Yes   Substance Use Topics   • Alcohol use: Not Currently   • Drug use: Never       Review of Systems   All other systems reviewed and are negative.      Physical Exam     ED Triage Vitals [05/09/23 1002]   ED Triage Vitals Group      Temp 96.4 °F (35.8 °C)      Heart Rate 81      Resp 16      /90      SpO2 98 %      EtCO2 mmHg       Height 5' 4\" (1.626 m)      Weight 125 lb 7.1 oz (56.9 kg)      Weight Scale Used Scale in bed      BMI (Calculated) 21.53      IBW/kg (Calculated) 54.7       Physical Exam  Vitals and nursing note reviewed.   Constitutional:       General: She is not in acute distress.     Appearance: Normal appearance. She is not ill-appearing, toxic-appearing or diaphoretic.   HENT:      Head: Normocephalic and atraumatic.      Right Ear: Tympanic membrane normal.      Left Ear: Tympanic  membrane normal.      Nose: Nose normal.      Mouth/Throat:      Pharynx: Oropharynx is clear. No oropharyngeal exudate or posterior oropharyngeal erythema.      Neck: Normal range of motion. No muscular tenderness.   Eyes:      Extraocular Movements: Extraocular movements intact.      Conjunctiva/sclera: Conjunctivae normal.      Pupils: Pupils are equal, round, and reactive to light.   Cardiovascular:      Rate and Rhythm: Normal rate and regular rhythm.      Pulses: Normal pulses.      Heart sounds: Normal heart sounds.   Pulmonary:      Effort: Pulmonary effort is normal. No respiratory distress.      Breath sounds: Normal breath sounds.   Abdominal:      General: Bowel sounds are normal. There is no distension.      Palpations: Abdomen is soft.      Tenderness: There is no abdominal tenderness. There is no guarding or rebound.   Musculoskeletal:         General: No tenderness or deformity. Normal range of motion.   Skin:     General: Skin is warm and dry.      Capillary Refill: Capillary refill takes less than 2 seconds.   Neurological:      General: No focal deficit present.      Mental Status: She is alert and oriented to person, place, and time.      Cranial Nerves: No cranial nerve deficit.      Motor: No weakness.   Psychiatric:         Mood and Affect: Mood normal.         ED Course     EKG: EKG shows sinus 64  QTc of 518 with deeply inverted T waves in V1 through V4 which are new compared to EKGs reviewed from April of this year, repeat obtained approximately 10 minutes later is similar in nature sinus 67  QTc 546 with continued deep T wave inversions V1 through V4.  Both EKGs have been sent to cardiology recommending heparin drip and sublingual nitroglycerin be given.    Lab Results     Results for orders placed or performed during the hospital encounter of 05/09/23   Basic Metabolic Panel   Result Value Ref Range    Fasting Status      Sodium 138 135 - 145 mmol/L    Potassium 3.8 3.4 - 5.1  mmol/L    Chloride 108 97 - 110 mmol/L    Carbon Dioxide 24 21 - 32 mmol/L    Anion Gap 10 7 - 19 mmol/L    Glucose 146 (H) 70 - 99 mg/dL    BUN 11 6 - 20 mg/dL    Creatinine 0.87 0.51 - 0.95 mg/dL    Glomerular Filtration Rate 73 >=60    BUN/Cr 13 7 - 25    Calcium 9.4 8.4 - 10.2 mg/dL   TROPONIN I, HIGH SENSITIVITY   Result Value Ref Range    Troponin I, High Sensitivity 15 <52 ng/L   Lipase   Result Value Ref Range    Lipase 108 73 - 393 Units/L   CBC with Automated Differential (performable only)   Result Value Ref Range    WBC 11.9 (H) 4.2 - 11.0 K/mcL    RBC 4.62 4.00 - 5.20 mil/mcL    HGB 13.9 12.0 - 15.5 g/dL    HCT 43.2 36.0 - 46.5 %    MCV 93.5 78.0 - 100.0 fl    MCH 30.1 26.0 - 34.0 pg    MCHC 32.2 32.0 - 36.5 g/dL    RDW-CV 14.0 11.0 - 15.0 %    RDW-SD 48.0 39.0 - 50.0 fL     140 - 450 K/mcL    NRBC 0 <=0 /100 WBC    Neutrophil, Percent 66 %    Lymphocytes, Percent 25 %    Mono, Percent 5 %    Eosinophils, Percent 3 %    Basophils, Percent 1 %    Immature Granulocytes 0 %    Absolute Neutrophils 7.9 (H) 1.8 - 7.7 K/mcL    Absolute Lymphocytes 3.0 1.0 - 4.0 K/mcL    Absolute Monocytes 0.6 0.3 - 0.9 K/mcL    Absolute Eosinophils  0.4 0.0 - 0.5 K/mcL    Absolute Basophils 0.1 0.0 - 0.3 K/mcL    Absolute Immature Granulocytes 0.0 0.0 - 0.2 K/mcL   Prothrombin Time   Result Value Ref Range    Prothrombin Time 10.3 9.7 - 11.8 sec    INR 1.0     Partial Thromboplastin Time   Result Value Ref Range    PTT 28 22 - 30 sec         Radiology Results     Imaging Results          XR CHEST AP OR PA (In process)  Result time 05/09/23 10:42:01   Procedure changed from XR CHEST PA AND LATERAL 2 VIEWS                 ED Medication Orders (From admission, onward)    Ordered Start     Status Ordering Provider    05/09/23 1036 05/09/23 1045  heparin (porcine) injection 3,400 Units  (Cardiac Heparin)  ONCE         Last MAR action: Given VITA COLEMAN    05/09/23 1036 05/09/23 1045  heparin (porcine) 25,000 units/250  mL in dextrose 5 % infusion  (Cardiac Heparin)  CONTINUOUS         Last MAR action: New Bag VITA COLEMAN    05/09/23 1036 05/09/23 1045  nitroGLYCERIN (NITROSTAT) sublingual tablet 0.4 mg  ONCE         Last MAR action: Given VITA COLEMAN    05/09/23 1007 05/09/23 1015  famotidine (PEPCID) injection 20 mg  ONCE         Last MAR action: Given KAYLA FAIRBANKS          ED Course as of 05/09/23 1146   Tue May 09, 2023   1021 Sinus rhythm rate of 64. Interval   [NP]   1036 Spoke with cardiology fellow who is asked for sublingual nitro given patient's continued belching though pain has largely resolved as well as initiating a heparin drip given the concerning T wave inversions noted in V1 through V4 which are new and worsened since previous EKGs last month. [JK]      ED Course User Index  [JK] Vita Coleman MD  [NP] Kayla Fairbanks       MDM: History and physical as above patient presents for evaluation of chest pain has an abnormal EKG initial troponin is negative given aspirin given sublingual nitro started on a heparin drip per cardiology evaluation evaluated the bedside do not plan to take to the Cath Lab at this point in time pending repeat labs and if chest pain returns.  Had an echo here in the emergency department that they report reviewed.  Does not seem consistent with PE PTX PNA dissection or aneurysm.  Initial troponin is within normal limits however suspect that this may uptrend given she came in immediately after pain started.    Clinical Impression     ED Diagnosis   1. Chest pain, unspecified type            Disposition        Admit 5/9/2023 11:40 AM  Telemetry Bed?: Yes  Admitting Physician: SANDY ORTEGA [[id number]]  Is this a telephone or verbal order?: This is a telephone order from the admitting physician  Transferring Patient to? Only adjust for [facility] [facility] [[id number]]      Vita Coleman MD    5/9/2023 10:37 AM       Patient was seen in conjunction with the resident physician.  I performed an independent evaluation including history and physical with the resident physician or after the patient was seen by the resident physician. For further details about elements of the patient encounter including PMH, Social History, Family History, and ROS, see the resident note, and I agree with his/her documentation and care except as noted.                    Juan Restrepo MD  05/09/23 114     No

## 2023-07-20 ENCOUNTER — APPOINTMENT (OUTPATIENT)
Dept: CARDIOLOGY | Facility: CLINIC | Age: 78
End: 2023-07-20
Payer: MEDICARE

## 2023-07-20 ENCOUNTER — NON-APPOINTMENT (OUTPATIENT)
Age: 78
End: 2023-07-20

## 2023-07-20 PROCEDURE — 93294 REM INTERROG EVL PM/LDLS PM: CPT

## 2023-07-20 PROCEDURE — 93296 REM INTERROG EVL PM/IDS: CPT

## 2023-07-20 NOTE — ED CDU PROVIDER INITIAL DAY NOTE - EYES, MLM
Clear bilaterally, pupils equal, round and reactive to light. Eucrisa Counseling: Patient may experience a mild burning sensation during topical application. Eucrisa is not approved in children less than 2 years of age.

## 2023-10-19 ENCOUNTER — APPOINTMENT (OUTPATIENT)
Dept: CARDIOLOGY | Facility: CLINIC | Age: 78
End: 2023-10-19
Payer: MEDICARE

## 2023-10-19 ENCOUNTER — NON-APPOINTMENT (OUTPATIENT)
Age: 78
End: 2023-10-19

## 2023-10-19 PROCEDURE — 93294 REM INTERROG EVL PM/LDLS PM: CPT

## 2023-10-19 PROCEDURE — 93296 REM INTERROG EVL PM/IDS: CPT

## 2023-11-04 NOTE — ED PROVIDER NOTE - IV ALTEPLASE EXCL ABS HIDDEN
Problem: NORMAL   Goal: Experiences normal transition  Description: INTERVENTIONS:  - Assess and monitor vital signs and lab values. - Encourage skin-to-skin with caregiver for thermoregulation  - Assess signs, symptoms and risk factors for hypoglycemia and follow protocol as needed. - Assess signs, symptoms and risk factors for jaundice risk and follow protocol as needed. - Utilize standard precautions and use personal protective equipment as indicated. Wash hands properly before and after each patient care activity.   - Ensure proper skin care and diapering and educate caregiver. - Follow proper infant identification and infant security measures (secure access to the unit, provider ID, visiting policy, VFA and Kisses system), and educate caregiver. - Ensure proper circumcision care and instruct/demonstrate to caregiver. Outcome: Progressing  Goal: Total weight loss less than 10% of birth weight  Description: INTERVENTIONS:  - Initiate breastfeeding within first hour after birth. - Encourage rooming-in.  - Assess infant feedings. - Monitor intake and output and daily weight.  - Encourage maternal fluid intake for breastfeeding mother.  - Encourage feeding on-demand or as ordered per pediatrician.  - Educate caregiver on proper bottle-feeding technique as needed. - Provide information about early infant feeding cues (e.g., rooting, lip smacking, sucking fingers/hand) versus late cue of crying.  - Review techniques for breastfeeding moms for expression (breast pumping) and storage of breast milk.   Outcome: Progressing show

## 2024-01-18 ENCOUNTER — APPOINTMENT (OUTPATIENT)
Dept: CARDIOLOGY | Facility: CLINIC | Age: 79
End: 2024-01-18
Payer: MEDICARE

## 2024-01-18 ENCOUNTER — NON-APPOINTMENT (OUTPATIENT)
Age: 79
End: 2024-01-18

## 2024-01-18 PROCEDURE — 93296 REM INTERROG EVL PM/IDS: CPT

## 2024-01-18 PROCEDURE — 93294 REM INTERROG EVL PM/LDLS PM: CPT

## 2024-01-19 ENCOUNTER — APPOINTMENT (OUTPATIENT)
Dept: OTOLARYNGOLOGY | Facility: CLINIC | Age: 79
End: 2024-01-19

## 2024-04-03 NOTE — ED CDU PROVIDER SUBSEQUENT DAY NOTE - NSICDXFAMILYHX_GEN_ALL_CORE_FT
FAMILY HISTORY:  Family history unknown    Father  Still living? No  Family history of diabetes mellitus (DM), Age at diagnosis: Age Unknown    Mother  Still living? No  Family history of diabetes mellitus (DM), Age at diagnosis: Age Unknown    
normal

## 2024-04-17 ENCOUNTER — NON-APPOINTMENT (OUTPATIENT)
Age: 79
End: 2024-04-17

## 2024-04-17 ENCOUNTER — APPOINTMENT (OUTPATIENT)
Dept: ELECTROPHYSIOLOGY | Facility: CLINIC | Age: 79
End: 2024-04-17

## 2024-04-18 ENCOUNTER — APPOINTMENT (OUTPATIENT)
Dept: CARDIOLOGY | Facility: CLINIC | Age: 79
End: 2024-04-18
Payer: MEDICARE

## 2024-04-18 PROCEDURE — 93294 REM INTERROG EVL PM/LDLS PM: CPT

## 2024-04-18 PROCEDURE — 93296 REM INTERROG EVL PM/IDS: CPT

## 2024-07-17 ENCOUNTER — NON-APPOINTMENT (OUTPATIENT)
Age: 79
End: 2024-07-17

## 2024-07-18 ENCOUNTER — APPOINTMENT (OUTPATIENT)
Dept: CARDIOLOGY | Facility: CLINIC | Age: 79
End: 2024-07-18
Payer: MEDICARE

## 2024-07-18 PROCEDURE — 93296 REM INTERROG EVL PM/IDS: CPT

## 2024-07-18 PROCEDURE — 93294 REM INTERROG EVL PM/LDLS PM: CPT

## 2024-08-15 ENCOUNTER — INPATIENT (INPATIENT)
Facility: HOSPITAL | Age: 79
LOS: 7 days | Discharge: NURSING, REHAB OR LONG-TERM CARE | DRG: 56 | End: 2024-08-23
Attending: INTERNAL MEDICINE | Admitting: INTERNAL MEDICINE
Payer: MEDICARE

## 2024-08-15 VITALS
SYSTOLIC BLOOD PRESSURE: 130 MMHG | DIASTOLIC BLOOD PRESSURE: 76 MMHG | OXYGEN SATURATION: 99 % | RESPIRATION RATE: 17 BRPM | TEMPERATURE: 98 F | HEIGHT: 65 IN | HEART RATE: 69 BPM | WEIGHT: 119.93 LBS

## 2024-08-15 DIAGNOSIS — Z90.710 ACQUIRED ABSENCE OF BOTH CERVIX AND UTERUS: Chronic | ICD-10-CM

## 2024-08-15 DIAGNOSIS — R06.02 SHORTNESS OF BREATH: ICD-10-CM

## 2024-08-15 LAB
ALBUMIN SERPL ELPH-MCNC: 4.6 G/DL — SIGNIFICANT CHANGE UP (ref 3.5–5.2)
ALP SERPL-CCNC: 99 U/L — SIGNIFICANT CHANGE UP (ref 30–115)
ALT FLD-CCNC: 10 U/L — SIGNIFICANT CHANGE UP (ref 0–41)
ANION GAP SERPL CALC-SCNC: 15 MMOL/L — HIGH (ref 7–14)
APPEARANCE UR: CLEAR — SIGNIFICANT CHANGE UP
AST SERPL-CCNC: 17 U/L — SIGNIFICANT CHANGE UP (ref 0–41)
BACTERIA # UR AUTO: NEGATIVE /HPF — SIGNIFICANT CHANGE UP
BASE EXCESS BLDV CALC-SCNC: 4.2 MMOL/L — HIGH (ref -2–3)
BASOPHILS # BLD AUTO: 0.04 K/UL — SIGNIFICANT CHANGE UP (ref 0–0.2)
BASOPHILS NFR BLD AUTO: 0.6 % — SIGNIFICANT CHANGE UP (ref 0–1)
BILIRUB SERPL-MCNC: 0.7 MG/DL — SIGNIFICANT CHANGE UP (ref 0.2–1.2)
BILIRUB UR-MCNC: NEGATIVE — SIGNIFICANT CHANGE UP
BUN SERPL-MCNC: 19 MG/DL — SIGNIFICANT CHANGE UP (ref 10–20)
CA-I SERPL-SCNC: 1.23 MMOL/L — SIGNIFICANT CHANGE UP (ref 1.15–1.33)
CALCIUM SERPL-MCNC: 9.5 MG/DL — SIGNIFICANT CHANGE UP (ref 8.4–10.5)
CAST: 1 /LPF — SIGNIFICANT CHANGE UP (ref 0–4)
CHLORIDE SERPL-SCNC: 102 MMOL/L — SIGNIFICANT CHANGE UP (ref 98–110)
CO2 SERPL-SCNC: 25 MMOL/L — SIGNIFICANT CHANGE UP (ref 17–32)
COLOR SPEC: YELLOW — SIGNIFICANT CHANGE UP
CREAT SERPL-MCNC: 0.9 MG/DL — SIGNIFICANT CHANGE UP (ref 0.7–1.5)
DIFF PNL FLD: ABNORMAL
EGFR: 65 ML/MIN/1.73M2 — SIGNIFICANT CHANGE UP
EOSINOPHIL # BLD AUTO: 0.06 K/UL — SIGNIFICANT CHANGE UP (ref 0–0.7)
EOSINOPHIL NFR BLD AUTO: 0.9 % — SIGNIFICANT CHANGE UP (ref 0–8)
GAS PNL BLDV: 137 MMOL/L — SIGNIFICANT CHANGE UP (ref 136–145)
GAS PNL BLDV: SIGNIFICANT CHANGE UP
GAS PNL BLDV: SIGNIFICANT CHANGE UP
GLUCOSE SERPL-MCNC: 96 MG/DL — SIGNIFICANT CHANGE UP (ref 70–99)
GLUCOSE UR QL: NEGATIVE MG/DL — SIGNIFICANT CHANGE UP
HCO3 BLDV-SCNC: 30 MMOL/L — HIGH (ref 22–29)
HCT VFR BLD CALC: 40.4 % — SIGNIFICANT CHANGE UP (ref 37–47)
HCT VFR BLDA CALC: 42 % — SIGNIFICANT CHANGE UP (ref 34.5–46.5)
HGB BLD CALC-MCNC: 14.1 G/DL — SIGNIFICANT CHANGE UP (ref 11.7–16.1)
HGB BLD-MCNC: 13.4 G/DL — SIGNIFICANT CHANGE UP (ref 12–16)
IMM GRANULOCYTES NFR BLD AUTO: 0.1 % — SIGNIFICANT CHANGE UP (ref 0.1–0.3)
KETONES UR-MCNC: ABNORMAL MG/DL
LACTATE BLDV-MCNC: 1.7 MMOL/L — SIGNIFICANT CHANGE UP (ref 0.5–2)
LEUKOCYTE ESTERASE UR-ACNC: ABNORMAL
LYMPHOCYTES # BLD AUTO: 2.37 K/UL — SIGNIFICANT CHANGE UP (ref 1.2–3.4)
LYMPHOCYTES # BLD AUTO: 34.6 % — SIGNIFICANT CHANGE UP (ref 20.5–51.1)
MCHC RBC-ENTMCNC: 29.2 PG — SIGNIFICANT CHANGE UP (ref 27–31)
MCHC RBC-ENTMCNC: 33.2 G/DL — SIGNIFICANT CHANGE UP (ref 32–37)
MCV RBC AUTO: 88 FL — SIGNIFICANT CHANGE UP (ref 81–99)
MONOCYTES # BLD AUTO: 0.35 K/UL — SIGNIFICANT CHANGE UP (ref 0.1–0.6)
MONOCYTES NFR BLD AUTO: 5.1 % — SIGNIFICANT CHANGE UP (ref 1.7–9.3)
NEUTROPHILS # BLD AUTO: 4.01 K/UL — SIGNIFICANT CHANGE UP (ref 1.4–6.5)
NEUTROPHILS NFR BLD AUTO: 58.7 % — SIGNIFICANT CHANGE UP (ref 42.2–75.2)
NITRITE UR-MCNC: NEGATIVE — SIGNIFICANT CHANGE UP
NRBC # BLD: 0 /100 WBCS — SIGNIFICANT CHANGE UP (ref 0–0)
PCO2 BLDV: 47 MMHG — HIGH (ref 39–42)
PH BLDV: 7.41 — SIGNIFICANT CHANGE UP (ref 7.32–7.43)
PH UR: 6 — SIGNIFICANT CHANGE UP (ref 5–8)
PLATELET # BLD AUTO: 242 K/UL — SIGNIFICANT CHANGE UP (ref 130–400)
PMV BLD: 10.8 FL — HIGH (ref 7.4–10.4)
PO2 BLDV: 16 MMHG — LOW (ref 25–45)
POTASSIUM BLDV-SCNC: 3.2 MMOL/L — LOW (ref 3.5–5.1)
POTASSIUM SERPL-MCNC: 3.3 MMOL/L — LOW (ref 3.5–5)
POTASSIUM SERPL-SCNC: 3.3 MMOL/L — LOW (ref 3.5–5)
PROT SERPL-MCNC: 6.8 G/DL — SIGNIFICANT CHANGE UP (ref 6–8)
PROT UR-MCNC: NEGATIVE MG/DL — SIGNIFICANT CHANGE UP
RBC # BLD: 4.59 M/UL — SIGNIFICANT CHANGE UP (ref 4.2–5.4)
RBC # FLD: 12.6 % — SIGNIFICANT CHANGE UP (ref 11.5–14.5)
RBC CASTS # UR COMP ASSIST: 5 /HPF — HIGH (ref 0–4)
SAO2 % BLDV: 29.6 % — LOW (ref 67–88)
SODIUM SERPL-SCNC: 142 MMOL/L — SIGNIFICANT CHANGE UP (ref 135–146)
SP GR SPEC: 1.01 — SIGNIFICANT CHANGE UP (ref 1–1.03)
SQUAMOUS # UR AUTO: 5 /HPF — SIGNIFICANT CHANGE UP (ref 0–5)
TROPONIN T, HIGH SENSITIVITY RESULT: 12 NG/L — SIGNIFICANT CHANGE UP (ref 6–13)
TROPONIN T, HIGH SENSITIVITY RESULT: 16 NG/L — HIGH (ref 6–13)
UROBILINOGEN FLD QL: 1 MG/DL — SIGNIFICANT CHANGE UP (ref 0.2–1)
WBC # BLD: 6.84 K/UL — SIGNIFICANT CHANGE UP (ref 4.8–10.8)
WBC # FLD AUTO: 6.84 K/UL — SIGNIFICANT CHANGE UP (ref 4.8–10.8)
WBC UR QL: 38 /HPF — HIGH (ref 0–5)

## 2024-08-15 PROCEDURE — 87086 URINE CULTURE/COLONY COUNT: CPT

## 2024-08-15 PROCEDURE — 99222 1ST HOSP IP/OBS MODERATE 55: CPT

## 2024-08-15 PROCEDURE — 80048 BASIC METABOLIC PNL TOTAL CA: CPT

## 2024-08-15 PROCEDURE — 99285 EMERGENCY DEPT VISIT HI MDM: CPT

## 2024-08-15 PROCEDURE — 83735 ASSAY OF MAGNESIUM: CPT

## 2024-08-15 PROCEDURE — 84484 ASSAY OF TROPONIN QUANT: CPT

## 2024-08-15 PROCEDURE — 97530 THERAPEUTIC ACTIVITIES: CPT | Mod: GP

## 2024-08-15 PROCEDURE — 81001 URINALYSIS AUTO W/SCOPE: CPT

## 2024-08-15 PROCEDURE — 82962 GLUCOSE BLOOD TEST: CPT

## 2024-08-15 PROCEDURE — 97110 THERAPEUTIC EXERCISES: CPT | Mod: GP

## 2024-08-15 PROCEDURE — 80053 COMPREHEN METABOLIC PANEL: CPT

## 2024-08-15 PROCEDURE — 85027 COMPLETE CBC AUTOMATED: CPT

## 2024-08-15 PROCEDURE — 71045 X-RAY EXAM CHEST 1 VIEW: CPT | Mod: 26

## 2024-08-15 PROCEDURE — 97161 PT EVAL LOW COMPLEX 20 MIN: CPT | Mod: GP

## 2024-08-15 PROCEDURE — 97116 GAIT TRAINING THERAPY: CPT | Mod: GP

## 2024-08-15 PROCEDURE — 36415 COLL VENOUS BLD VENIPUNCTURE: CPT

## 2024-08-15 PROCEDURE — 85025 COMPLETE CBC W/AUTO DIFF WBC: CPT

## 2024-08-15 PROCEDURE — 82746 ASSAY OF FOLIC ACID SERUM: CPT

## 2024-08-15 PROCEDURE — 87077 CULTURE AEROBIC IDENTIFY: CPT

## 2024-08-15 PROCEDURE — 84443 ASSAY THYROID STIM HORMONE: CPT

## 2024-08-15 PROCEDURE — 82607 VITAMIN B-12: CPT

## 2024-08-15 PROCEDURE — 86780 TREPONEMA PALLIDUM: CPT

## 2024-08-15 RX ORDER — DONEPEZIL HYDROCHLORIDE 5 MG/1
25 TABLET, FILM COATED ORAL AT BEDTIME
Refills: 0 | Status: DISCONTINUED | OUTPATIENT
Start: 2024-08-15 | End: 2024-08-15

## 2024-08-15 RX ORDER — SODIUM CHLORIDE 9 MG/ML
1000 INJECTION INTRAMUSCULAR; INTRAVENOUS; SUBCUTANEOUS ONCE
Refills: 0 | Status: COMPLETED | OUTPATIENT
Start: 2024-08-15 | End: 2024-08-15

## 2024-08-15 RX ORDER — AMLODIPINE BESYLATE 10 MG/1
5 TABLET ORAL DAILY
Refills: 0 | Status: DISCONTINUED | OUTPATIENT
Start: 2024-08-15 | End: 2024-08-21

## 2024-08-15 RX ORDER — DONEPEZIL HYDROCHLORIDE 5 MG/1
23 TABLET, FILM COATED ORAL AT BEDTIME
Refills: 0 | Status: DISCONTINUED | OUTPATIENT
Start: 2024-08-15 | End: 2024-08-15

## 2024-08-15 RX ORDER — POTASSIUM CHLORIDE 10 MEQ
10 TABLET, EXT RELEASE, PARTICLES/CRYSTALS ORAL DAILY
Refills: 0 | Status: DISCONTINUED | OUTPATIENT
Start: 2024-08-15 | End: 2024-08-21

## 2024-08-15 RX ORDER — POTASSIUM CHLORIDE 10 MEQ
20 TABLET, EXT RELEASE, PARTICLES/CRYSTALS ORAL ONCE
Refills: 0 | Status: COMPLETED | OUTPATIENT
Start: 2024-08-15 | End: 2024-08-15

## 2024-08-15 RX ORDER — SERTRALINE HYDROCHLORIDE 50 MG/1
100 TABLET, FILM COATED ORAL DAILY
Refills: 0 | Status: DISCONTINUED | OUTPATIENT
Start: 2024-08-15 | End: 2024-08-23

## 2024-08-15 RX ORDER — SIMVASTATIN 10 MG
20 TABLET ORAL AT BEDTIME
Refills: 0 | Status: DISCONTINUED | OUTPATIENT
Start: 2024-08-15 | End: 2024-08-23

## 2024-08-15 RX ORDER — ENOXAPARIN SODIUM 100 MG/ML
40 INJECTION SUBCUTANEOUS EVERY 24 HOURS
Refills: 0 | Status: DISCONTINUED | OUTPATIENT
Start: 2024-08-15 | End: 2024-08-23

## 2024-08-15 RX ORDER — DONEPEZIL HYDROCHLORIDE 5 MG/1
20 TABLET, FILM COATED ORAL AT BEDTIME
Refills: 0 | Status: DISCONTINUED | OUTPATIENT
Start: 2024-08-15 | End: 2024-08-23

## 2024-08-15 RX ADMIN — SODIUM CHLORIDE 1000 MILLILITER(S): 9 INJECTION INTRAMUSCULAR; INTRAVENOUS; SUBCUTANEOUS at 16:54

## 2024-08-15 RX ADMIN — Medication 20 MILLIEQUIVALENT(S): at 16:54

## 2024-08-15 NOTE — ED ADULT NURSE NOTE - NSFALLHARMRISKINTERV_ED_ALL_ED
FAMILY HISTORY:  No pertinent family history in first degree relatives    
Communicate risk of Fall with Harm to all staff, patient, and family/Provide visual cue: red socks, yellow wristband, yellow gown, etc/Reinforce activity limits and safety measures with patient and family/Use of alarms - bed, stretcher, chair and/or video monitoring/Bed in lowest position, wheels locked, appropriate side rails in place/Call bell, personal items and telephone in reach/Instruct patient to call for assistance before getting out of bed/chair/stretcher/Non-slip footwear applied when patient is off stretcher/[place] to call system/Physically safe environment - no spills, clutter or unnecessary equipment/Purposeful Proactive Rounding/Room/bathroom lighting operational, light cord in reach

## 2024-08-15 NOTE — H&P ADULT - ASSESSMENT
Patient is a 79yr old female with History of Alzheimer's Dementia, HTN, PVD, CHB s/p pacemaker presenting for placement    #Placement   #HO Alzheimers dementia   - HD stable, afebrile  - c/w donepezil 25 mg qhs  - c/w sertaline 100mg OD, please confirm dosing tomorrow as daughter will contact PCP to clarify   - social work c/s for placement   - PT/OT     #HTN   #PVD   - c/w Norvasc and HCTZ  - c/w potassium supplementation   - c/w statin     #CHB s/p pacemaker   - EKG paced rhythm    Diet: DASH   DVT: Lovenox

## 2024-08-15 NOTE — H&P ADULT - NSHPPHYSICALEXAM_GEN_ALL_CORE
CONSTITUTIONAL: well-appearing, in NAD  SKIN: Warm dry, normal skin turgor  HEAD: NCAT  EYES: EOMI, PERRLA, no scleral icterus, conjunctiva pink  ENT: normal pharynx with no erythema or exudates  NECK: Supple; non tender. Full ROM.  CARD: RRR, no murmurs.  RESP: clear to ausculation b/l. No crackles or wheezing.  ABD: soft, non-tender, non-distended, no rebound or guarding.  EXT: Full ROM, no bony tenderness, no pedal edema, no calf tenderness  NEURO: normal motor. normal sensory. CN II-XII intact. Cerebellar testing normal. Normal gait.  PSYCH: Cooperative, appropriate

## 2024-08-15 NOTE — H&P ADULT - HISTORY OF PRESENT ILLNESS
Patient is a 79yr old female with History of Alzheimer's Dementia, HTN, PVD, CHB s/p pacemaker presenting for placement. As per daughter who brought patient in, Patient lives by herself and has been noticed to be frequently wandering around neighborhood often found and brought back by neighbors. Daughter is concerned patient is not eating as home as she has been losing weight and medication bottles seem to be unused. PCP prompted family to bring in patient for placement. Otherwise denies any chest pain, SOB, fever, chills, headache, changes in vision, cough, congestion, n/v/d, abd pain, constipation, urinary complaints, lower extremity pain/swelling.  In the ED patient HD stable, afebrile, Labs and imaging unremarkable     Vital Signs Last 24 Hrs  T(C): 36.8 (15 Aug 2024 13:39), Max: 36.8 (15 Aug 2024 13:39)  T(F): 98.2 (15 Aug 2024 13:39), Max: 98.2 (15 Aug 2024 13:39)  HR: 65 (15 Aug 2024 17:11) (65 - 69)  BP: 132/71 (15 Aug 2024 17:11) (130/76 - 132/71)  BP(mean): --  RR: 18 (15 Aug 2024 17:11) (17 - 18)  SpO2: 99% (15 Aug 2024 17:11) (99% - 99%)    Parameters below as of 15 Aug 2024 17:11  Patient On (Oxygen Delivery Method): room air

## 2024-08-15 NOTE — ED ADULT TRIAGE NOTE - CHIEF COMPLAINT QUOTE
pt with difficulty breathing & SOB xfew days  as per family, pt with worsening alzheimers/dementia & family unable to care for patient at home  also requesting  to discuss possibly placement into facility

## 2024-08-15 NOTE — ED PROVIDER NOTE - ATTENDING CONTRIBUTION TO CARE
I have reviewed and agree with the mid-level note, except as documented in my note below.    80 y/o female h/o HTN, PVD, PPM, lives alone, no additional assistance, no cane / walker, p/w chronic cough and SOB, family also report pt has not been compliant with meds, not eating and has been wandering out of the house at night, denies fever, hemoptysis, orthopnea, PND, chest pain, LE pain or swelling, recent immobilization or travel or other associated complaints at present. Old chart reviewed. I have reviewed and agree with the initial nursing note, except as documented in my note.    VSS, awake, alert, non-toxic appearing, ears clear, oropharynx clear, no skin rash or lesions, no tracheal deviation, non-labored breathing without accessory muscle use apparent or pursed lip breathing, no retractions, speaks full sentences, chest CTAB, no w/r/r, +S1/S2, RRR, no m/r/g, abdomen soft, NT, ND, +BS, AO x 3, clear speech.

## 2024-08-15 NOTE — H&P ADULT - ATTENDING COMMENTS
Patient seen and examined at bedside independently of the residents. I read the resident's note and agree with the plan with the additions and corrections as noted below. My note supersedes the resident's note.     REVIEW OF SYSTEMS:  Negative except in HPI.     PMH: Alzheimer dementia, HTN, PAD, Complete heart block s/p PPM     FHx: Reviewed. No fhx of asthma/copd, No fhx of liver and pulmonary disease. No fhx of hematological disorder.     Physical Exam:  GEN: No acute distress. Awake, Alert and oriented x 1.   Head: Atraumatic, Normocephalic.   Eye: PEERLA. No sclera icterus. EOMI.   ENT: Normal oropharynx, no thyromegaly, no mass, no lymphadenopathy.   LUNGS: Clear to auscultation bilaterally. No wheeze/rales/crackles.   HEART: Normal. S1/S2 present. RRR. No murmur/gallops.   ABD: Soft, non-tender, non-distended. Bowel sounds present.   EXT: No pitting edema. No erythema. No tenderness.  Integumentary: No rash, No sore, No petechia.   NEURO: CN III-XII intact. Strength: 5/5 b/l ULE. Sensory intact b/l ULE.     Vital Signs Last 24 Hrs  T(C): 36.3 (15 Aug 2024 23:45), Max: 36.8 (15 Aug 2024 13:39)  T(F): 97.4 (15 Aug 2024 23:45), Max: 98.2 (15 Aug 2024 13:39)  HR: 67 (15 Aug 2024 23:45) (65 - 69)  BP: 158/74 (15 Aug 2024 23:45) (130/76 - 158/74)  BP(mean): 102 (15 Aug 2024 23:45) (102 - 102)  RR: 18 (15 Aug 2024 23:45) (17 - 18)  SpO2: 97% (15 Aug 2024 23:45) (97% - 99%)    Parameters below as of 15 Aug 2024 23:45  Patient On (Oxygen Delivery Method): room air      Please see the above notes for Labs and radiology.     Assessment and Plan:     78 yo F with hx of Alzheimer dementia, HTN, PAD, Complete heart block s/p PPM sent in by daughter for placement. Patient reports that she is feeling a little depressed but no suicidal ideation.     Alzheimer dementia   - check TSH, B12, folate  - c/w donepezil   - will get Psych eval for depression cause of mental decline.   - Fall precaution.   - PT/Rehab.   -  consult.     Depression   - patient reports feeling depressed. Denied suicidal ideation.   - On sertraline.  - will get Psych eval.     Hypokalemia   - replete prn    HTN - c/w amlodipine   PAD - c/w home med.     DVT ppx: Lovenox SC  GI ppx:  not indicated.   Diet: DASH diet   Activity: as tolerated.     Date seen by the attendin/15/2024

## 2024-08-15 NOTE — ED PROVIDER NOTE - PROGRESS NOTE DETAILS
pk:  Per family pt has not been taking care of herself at home and would like the patient admitted for placement.

## 2024-08-15 NOTE — ED PROVIDER NOTE - CARE PLAN
1 Principal Discharge DX:	Hospital admission due to social situation  Secondary Diagnosis:	Breath shortness

## 2024-08-15 NOTE — ED PROVIDER NOTE - OBJECTIVE STATEMENT
Patient is a 79yr old female with HTN, PVD, w/ pacemaker in place for complete HB p/w sob x1 week associated with intermittent dull chest pain. Otherwise denies any fever, chills, headache, changes in vision, cough, congestion, n/v/d, abd pain, constipation, urinary complaints, lower extremity pain/swelling.

## 2024-08-15 NOTE — ED ADULT NURSE NOTE - OBJECTIVE STATEMENT
pt with difficulty breathing & SOB xfew days  as per family, pt with worsening alzheimers/dementia & family unable to care for patient at home  also requesting  to discuss possibly placement into facility. Pt denies SOB on assessment, pt's breathing unlabored. Pt changed into hospital gown and fall precautions in place as per hospital policy. Care ongoing.

## 2024-08-15 NOTE — ED PROVIDER NOTE - CLINICAL SUMMARY MEDICAL DECISION MAKING FREE TEXT BOX
Patient's VSS, labs, EKG, XR non-diagnostic. Presentation not consistent with acute cardiac etiologies to include ACS (non ischemic ekg), CHF, pericardial effusion / tamponade. Presentation not consistent with acute respiratory etiologies to include acute PE, pneumothorax , asthma, COPD exacerbation, allergic etiologies, or infectious etiologies such as PNA. Presentation also not consistent with non-cardiopulmonary causes to include toxidromes, metabolic etiologies such as acidemia or electrolyte derangements, sepsis, neurologic causes (i.e. demyelinating diseases). Plan to admit for further evaluation and management.

## 2024-08-16 LAB
ANION GAP SERPL CALC-SCNC: 13 MMOL/L — SIGNIFICANT CHANGE UP (ref 7–14)
BUN SERPL-MCNC: 16 MG/DL — SIGNIFICANT CHANGE UP (ref 10–20)
CALCIUM SERPL-MCNC: 9.3 MG/DL — SIGNIFICANT CHANGE UP (ref 8.4–10.5)
CHLORIDE SERPL-SCNC: 104 MMOL/L — SIGNIFICANT CHANGE UP (ref 98–110)
CO2 SERPL-SCNC: 23 MMOL/L — SIGNIFICANT CHANGE UP (ref 17–32)
CREAT SERPL-MCNC: 0.8 MG/DL — SIGNIFICANT CHANGE UP (ref 0.7–1.5)
EGFR: 75 ML/MIN/1.73M2 — SIGNIFICANT CHANGE UP
GLUCOSE SERPL-MCNC: 97 MG/DL — SIGNIFICANT CHANGE UP (ref 70–99)
HCT VFR BLD CALC: 39.9 % — SIGNIFICANT CHANGE UP (ref 37–47)
HGB BLD-MCNC: 13.3 G/DL — SIGNIFICANT CHANGE UP (ref 12–16)
MAGNESIUM SERPL-MCNC: 2 MG/DL — SIGNIFICANT CHANGE UP (ref 1.8–2.4)
MCHC RBC-ENTMCNC: 29.5 PG — SIGNIFICANT CHANGE UP (ref 27–31)
MCHC RBC-ENTMCNC: 33.3 G/DL — SIGNIFICANT CHANGE UP (ref 32–37)
MCV RBC AUTO: 88.5 FL — SIGNIFICANT CHANGE UP (ref 81–99)
NRBC # BLD: 0 /100 WBCS — SIGNIFICANT CHANGE UP (ref 0–0)
PLATELET # BLD AUTO: 181 K/UL — SIGNIFICANT CHANGE UP (ref 130–400)
PMV BLD: 12.1 FL — HIGH (ref 7.4–10.4)
POTASSIUM SERPL-MCNC: 3.8 MMOL/L — SIGNIFICANT CHANGE UP (ref 3.5–5)
POTASSIUM SERPL-SCNC: 3.8 MMOL/L — SIGNIFICANT CHANGE UP (ref 3.5–5)
RBC # BLD: 4.51 M/UL — SIGNIFICANT CHANGE UP (ref 4.2–5.4)
RBC # FLD: 12.6 % — SIGNIFICANT CHANGE UP (ref 11.5–14.5)
SODIUM SERPL-SCNC: 140 MMOL/L — SIGNIFICANT CHANGE UP (ref 135–146)
TROPONIN T, HIGH SENSITIVITY RESULT: 13 NG/L — SIGNIFICANT CHANGE UP (ref 6–13)
TSH SERPL-MCNC: 3.04 UIU/ML — SIGNIFICANT CHANGE UP (ref 0.27–4.2)
WBC # BLD: 6.96 K/UL — SIGNIFICANT CHANGE UP (ref 4.8–10.8)
WBC # FLD AUTO: 6.96 K/UL — SIGNIFICANT CHANGE UP (ref 4.8–10.8)

## 2024-08-16 PROCEDURE — 99232 SBSQ HOSP IP/OBS MODERATE 35: CPT

## 2024-08-16 RX ADMIN — ENOXAPARIN SODIUM 40 MILLIGRAM(S): 100 INJECTION SUBCUTANEOUS at 11:34

## 2024-08-16 RX ADMIN — SERTRALINE HYDROCHLORIDE 100 MILLIGRAM(S): 50 TABLET, FILM COATED ORAL at 11:33

## 2024-08-16 RX ADMIN — DONEPEZIL HYDROCHLORIDE 20 MILLIGRAM(S): 5 TABLET, FILM COATED ORAL at 21:27

## 2024-08-16 RX ADMIN — Medication 20 MILLIGRAM(S): at 21:29

## 2024-08-16 RX ADMIN — Medication 10 MILLIEQUIVALENT(S): at 11:33

## 2024-08-16 RX ADMIN — Medication 12.5 MILLIGRAM(S): at 21:29

## 2024-08-16 NOTE — PHYSICAL THERAPY INITIAL EVALUATION ADULT - ADDITIONAL COMMENTS
Pt lives alone in home with no steps to enter, independent in ambulation. Family reports frequent wandering.

## 2024-08-16 NOTE — PROGRESS NOTE ADULT - ASSESSMENT
Patient is a 79yr old female with History of Alzheimer's Dementia, HTN, PVD, CHB s/p pacemaker presenting for placement    #Placement   #HO Alzheimers dementia   - HD stable, afebrile  - c/w donepezil 25 mg qhs  - could not confirm home seroquel dose, started 12.5 mg bedtime. use zyprexa prn agitation   - social work c/s for placement   - PT/OT     #HTN   #PVD   - c/w Norvasc and HCTZ  - c/w potassium supplementation   - c/w statin     #CHB s/p pacemaker   - EKG paced rhythm    Diet: DASH   DVT: Lovenox

## 2024-08-16 NOTE — PHYSICAL THERAPY INITIAL EVALUATION ADULT - CRITERIA FOR SKILLED THERAPEUTIC INTERVENTIONS
09/24/22 0900   Vital Signs   Temp 98.3 °F (36.8 °C)   Temp Source Oral   Heart Rate 92   Heart Rate Source Monitor   Resp 18   /76   BP Location Right lower arm   MAP (Calculated) 91   Patient Position Semi fowlers   Level of Consciousness 0   MEWS Score 1   Pain Assessment   Pain Assessment 0-10   Pain Level 10   Patient's Stated Pain Goal 0 - No pain   Pain Location Rib cage   Pain Orientation Right;Left   Pain Descriptors Sharp   Functional Pain Assessment Prevents or interferes some active activities and ADLs   Pain Type Acute pain   Non-Pharmaceutical Pain Intervention(s) Declines   Oxygen Therapy   SpO2 95 %   O2 Device Nasal cannula   Skin Assessment Clean, dry, & intact   O2 Flow Rate (L/min) 4 L/min       Pt assessment complete; see flow sheets. Vitals completed. Meds given per MAR. Pt resting in bed. Pt denies any other care needs at this time. Pt stable. MD at bedside to talk to Pt about bone marrow biopsy. Pt and family agreeable. Goal for biopsy on Monday.      Agustín Saleh RN impairments found

## 2024-08-16 NOTE — PHYSICAL THERAPY INITIAL EVALUATION ADULT - PERTINENT HX OF CURRENT PROBLEM, REHAB EVAL
79yr old female with History of Alzheimer's Dementia, HTN, PVD, CHB s/p pacemaker presenting for placement. As per daughter who brought patient in, Patient lives by herself and has been noticed to be frequently wandering around neighborhood often found and brought back by neighbors. Daughter is concerned patient is not eating as home as she has been losing weight and medication bottles seem to be unused. PCP prompted family to bring in patient for placement. Otherwise denies any chest pain, SOB, fever, chills, headache, changes in vision, cough, congestion, n/v/d, abd pain, constipation, urinary complaints, lower extremity pain/swelling.  In the ED patient HD stable, afebrile, Labs and imaging unremarkable

## 2024-08-17 LAB
CULTURE RESULTS: ABNORMAL
FOLATE SERPL-MCNC: >20 NG/ML — SIGNIFICANT CHANGE UP
GLUCOSE BLDC GLUCOMTR-MCNC: 94 MG/DL — SIGNIFICANT CHANGE UP (ref 70–99)
SPECIMEN SOURCE: SIGNIFICANT CHANGE UP
T PALLIDUM AB TITR SER: NEGATIVE — SIGNIFICANT CHANGE UP
VIT B12 SERPL-MCNC: 577 PG/ML — SIGNIFICANT CHANGE UP (ref 232–1245)

## 2024-08-17 PROCEDURE — 99231 SBSQ HOSP IP/OBS SF/LOW 25: CPT

## 2024-08-17 RX ADMIN — SERTRALINE HYDROCHLORIDE 100 MILLIGRAM(S): 50 TABLET, FILM COATED ORAL at 12:15

## 2024-08-17 RX ADMIN — Medication 20 MILLIGRAM(S): at 21:33

## 2024-08-17 RX ADMIN — Medication 10 MILLIEQUIVALENT(S): at 12:15

## 2024-08-17 RX ADMIN — AMLODIPINE BESYLATE 5 MILLIGRAM(S): 10 TABLET ORAL at 05:21

## 2024-08-17 RX ADMIN — DONEPEZIL HYDROCHLORIDE 20 MILLIGRAM(S): 5 TABLET, FILM COATED ORAL at 21:32

## 2024-08-17 RX ADMIN — Medication 12.5 MILLIGRAM(S): at 21:30

## 2024-08-17 RX ADMIN — ENOXAPARIN SODIUM 40 MILLIGRAM(S): 100 INJECTION SUBCUTANEOUS at 12:15

## 2024-08-17 NOTE — PROGRESS NOTE ADULT - ASSESSMENT
·	Social issue - inability to render care for self - Placement   ·	Alzheimer's dementia   ·	HTN/PVD  ·	pacemaker     -d/c telemetry   -labs stable - limit labwork until indicated as per clinical status   -continue with home meds   -skin assessment and care as per the nursing protocol     DISPO: NH placement   -staff updated family     Attending Physician Dr. Salena Beach # [id number]

## 2024-08-18 PROCEDURE — 99231 SBSQ HOSP IP/OBS SF/LOW 25: CPT

## 2024-08-18 RX ADMIN — ENOXAPARIN SODIUM 40 MILLIGRAM(S): 100 INJECTION SUBCUTANEOUS at 12:46

## 2024-08-18 RX ADMIN — AMLODIPINE BESYLATE 5 MILLIGRAM(S): 10 TABLET ORAL at 05:48

## 2024-08-18 RX ADMIN — DONEPEZIL HYDROCHLORIDE 20 MILLIGRAM(S): 5 TABLET, FILM COATED ORAL at 21:12

## 2024-08-18 RX ADMIN — Medication 20 MILLIGRAM(S): at 21:12

## 2024-08-18 RX ADMIN — Medication 10 MILLIEQUIVALENT(S): at 12:59

## 2024-08-18 RX ADMIN — Medication 12.5 MILLIGRAM(S): at 21:11

## 2024-08-18 RX ADMIN — SERTRALINE HYDROCHLORIDE 100 MILLIGRAM(S): 50 TABLET, FILM COATED ORAL at 12:46

## 2024-08-18 NOTE — PROGRESS NOTE ADULT - ASSESSMENT
Patient is a 79yr old female with History of Alzheimer's Dementia, HTN, PVD, CHB s/p pacemaker presenting for placement    #Placement   #HO Alzheimers dementia   - HD stable, afebrile  - c/w donepezil 25 mg qhs  - c/w seroquel 12.5 mg bedtime  - c/w zyprexa prn agitation   - f/u social work c/s for placement   - PT/OT recommended [facility]   - d/c telemetry    #HTN   #PVD   - c/w Norvasc and HCTZ  - c/w potassium supplementation   - c/w statin     #CHB s/p pacemaker   - EKG paced rhythm    #MISC  - Diet: DASH   - DVT ppx: Lovenox  - GI ppx: not needed    Pending: placementcov

## 2024-08-18 NOTE — PROGRESS NOTE ADULT - ASSESSMENT
·	Social issue - inability to render care for self - Placement   ·	Alzheimer's dementia   ·	HTN/PVD  ·	pacemaker     -d/c telemetry   -labs stable - limit labwork until indicated as per clinical status   -continue with home meds   -skin assessment and care as per the nursing protocol     DISPO: NH placement - CM aware and patient is anticipated for discharge   -staff updated family     Attending Physician Dr. Salena Beach # [id number]

## 2024-08-19 LAB
ALBUMIN SERPL ELPH-MCNC: 4.2 G/DL — SIGNIFICANT CHANGE UP (ref 3.5–5.2)
ALP SERPL-CCNC: 92 U/L — SIGNIFICANT CHANGE UP (ref 30–115)
ALT FLD-CCNC: 8 U/L — SIGNIFICANT CHANGE UP (ref 0–41)
ANION GAP SERPL CALC-SCNC: 13 MMOL/L — SIGNIFICANT CHANGE UP (ref 7–14)
AST SERPL-CCNC: 14 U/L — SIGNIFICANT CHANGE UP (ref 0–41)
BASOPHILS # BLD AUTO: 0.04 K/UL — SIGNIFICANT CHANGE UP (ref 0–0.2)
BASOPHILS NFR BLD AUTO: 0.6 % — SIGNIFICANT CHANGE UP (ref 0–1)
BILIRUB SERPL-MCNC: 0.6 MG/DL — SIGNIFICANT CHANGE UP (ref 0.2–1.2)
BUN SERPL-MCNC: 21 MG/DL — HIGH (ref 10–20)
CALCIUM SERPL-MCNC: 9.5 MG/DL — SIGNIFICANT CHANGE UP (ref 8.4–10.5)
CHLORIDE SERPL-SCNC: 104 MMOL/L — SIGNIFICANT CHANGE UP (ref 98–110)
CO2 SERPL-SCNC: 26 MMOL/L — SIGNIFICANT CHANGE UP (ref 17–32)
CREAT SERPL-MCNC: 0.9 MG/DL — SIGNIFICANT CHANGE UP (ref 0.7–1.5)
EGFR: 65 ML/MIN/1.73M2 — SIGNIFICANT CHANGE UP
EOSINOPHIL # BLD AUTO: 0.13 K/UL — SIGNIFICANT CHANGE UP (ref 0–0.7)
EOSINOPHIL NFR BLD AUTO: 2 % — SIGNIFICANT CHANGE UP (ref 0–8)
GLUCOSE SERPL-MCNC: 95 MG/DL — SIGNIFICANT CHANGE UP (ref 70–99)
HCT VFR BLD CALC: 39.2 % — SIGNIFICANT CHANGE UP (ref 37–47)
HGB BLD-MCNC: 13.2 G/DL — SIGNIFICANT CHANGE UP (ref 12–16)
IMM GRANULOCYTES NFR BLD AUTO: 0.2 % — SIGNIFICANT CHANGE UP (ref 0.1–0.3)
LYMPHOCYTES # BLD AUTO: 2.29 K/UL — SIGNIFICANT CHANGE UP (ref 1.2–3.4)
LYMPHOCYTES # BLD AUTO: 34.7 % — SIGNIFICANT CHANGE UP (ref 20.5–51.1)
MAGNESIUM SERPL-MCNC: 1.8 MG/DL — SIGNIFICANT CHANGE UP (ref 1.8–2.4)
MCHC RBC-ENTMCNC: 29.7 PG — SIGNIFICANT CHANGE UP (ref 27–31)
MCHC RBC-ENTMCNC: 33.7 G/DL — SIGNIFICANT CHANGE UP (ref 32–37)
MCV RBC AUTO: 88.3 FL — SIGNIFICANT CHANGE UP (ref 81–99)
MONOCYTES # BLD AUTO: 0.49 K/UL — SIGNIFICANT CHANGE UP (ref 0.1–0.6)
MONOCYTES NFR BLD AUTO: 7.4 % — SIGNIFICANT CHANGE UP (ref 1.7–9.3)
NEUTROPHILS # BLD AUTO: 3.63 K/UL — SIGNIFICANT CHANGE UP (ref 1.4–6.5)
NEUTROPHILS NFR BLD AUTO: 55.1 % — SIGNIFICANT CHANGE UP (ref 42.2–75.2)
NRBC # BLD: 0 /100 WBCS — SIGNIFICANT CHANGE UP (ref 0–0)
PLATELET # BLD AUTO: 227 K/UL — SIGNIFICANT CHANGE UP (ref 130–400)
PMV BLD: 11 FL — HIGH (ref 7.4–10.4)
POTASSIUM SERPL-MCNC: 3.3 MMOL/L — LOW (ref 3.5–5)
POTASSIUM SERPL-SCNC: 3.3 MMOL/L — LOW (ref 3.5–5)
PROT SERPL-MCNC: 6.2 G/DL — SIGNIFICANT CHANGE UP (ref 6–8)
RBC # BLD: 4.44 M/UL — SIGNIFICANT CHANGE UP (ref 4.2–5.4)
RBC # FLD: 12.7 % — SIGNIFICANT CHANGE UP (ref 11.5–14.5)
SODIUM SERPL-SCNC: 143 MMOL/L — SIGNIFICANT CHANGE UP (ref 135–146)
WBC # BLD: 6.59 K/UL — SIGNIFICANT CHANGE UP (ref 4.8–10.8)
WBC # FLD AUTO: 6.59 K/UL — SIGNIFICANT CHANGE UP (ref 4.8–10.8)

## 2024-08-19 PROCEDURE — 99231 SBSQ HOSP IP/OBS SF/LOW 25: CPT

## 2024-08-19 RX ORDER — POTASSIUM CHLORIDE 10 MEQ
20 TABLET, EXT RELEASE, PARTICLES/CRYSTALS ORAL
Refills: 0 | Status: COMPLETED | OUTPATIENT
Start: 2024-08-19 | End: 2024-08-19

## 2024-08-19 RX ADMIN — Medication 20 MILLIEQUIVALENT(S): at 17:13

## 2024-08-19 RX ADMIN — ENOXAPARIN SODIUM 40 MILLIGRAM(S): 100 INJECTION SUBCUTANEOUS at 11:23

## 2024-08-19 RX ADMIN — Medication 10 MILLIEQUIVALENT(S): at 11:22

## 2024-08-19 RX ADMIN — Medication 20 MILLIGRAM(S): at 21:25

## 2024-08-19 RX ADMIN — SERTRALINE HYDROCHLORIDE 100 MILLIGRAM(S): 50 TABLET, FILM COATED ORAL at 11:23

## 2024-08-19 RX ADMIN — Medication 12.5 MILLIGRAM(S): at 21:24

## 2024-08-19 RX ADMIN — Medication 20 MILLIEQUIVALENT(S): at 18:14

## 2024-08-19 RX ADMIN — AMLODIPINE BESYLATE 5 MILLIGRAM(S): 10 TABLET ORAL at 05:27

## 2024-08-19 RX ADMIN — DONEPEZIL HYDROCHLORIDE 20 MILLIGRAM(S): 5 TABLET, FILM COATED ORAL at 21:24

## 2024-08-19 NOTE — PROGRESS NOTE ADULT - ASSESSMENT
·	Social issue - inability to render care for self - Placement   ·	Alzheimer's dementia   ·	HTN/PVD  ·	pacemaker   ·	hypokalemia     -d/c telemetry   -replete electrolytes - limit labwork   -continue with home meds   -skin assessment and care as per the nursing protocol     DISPO: NH placement - CM aware and patient is anticipated for discharge planning - no need for telemetry monitoring   -updated daughter this morning at bedside     Attending Physician Dr. Salena Beach # [id number]

## 2024-08-19 NOTE — PROGRESS NOTE ADULT - ASSESSMENT
Patient is a 79yr old female with History of Alzheimer's Dementia, HTN, PVD, CHB s/p pacemaker, who is presenting for placement.    #Placement   #HO Alzheimers dementia   - HD stable, afebrile  - c/w donepezil 25 mg qhs  - c/w seroquel 12.5 mg bedtime  - c/w zyprexa prn agitation   - f/u social work c/s for placement   - PT/OT recommended [facility]   - d/c telemetry    #HTN   #PVD   - c/w Norvasc and HCTZ  - c/w potassium supplementation   - c/w statin     #CHB s/p pacemaker   - EKG paced rhythm    #MISC  - Diet: DASH   - DVT ppx: Lovenox  - GI ppx: not needed    Pending: dc pending placement

## 2024-08-20 ENCOUNTER — TRANSCRIPTION ENCOUNTER (OUTPATIENT)
Age: 79
End: 2024-08-20

## 2024-08-20 PROCEDURE — 99233 SBSQ HOSP IP/OBS HIGH 50: CPT

## 2024-08-20 RX ADMIN — Medication 20 MILLIGRAM(S): at 21:14

## 2024-08-20 RX ADMIN — SERTRALINE HYDROCHLORIDE 100 MILLIGRAM(S): 50 TABLET, FILM COATED ORAL at 11:48

## 2024-08-20 RX ADMIN — DONEPEZIL HYDROCHLORIDE 20 MILLIGRAM(S): 5 TABLET, FILM COATED ORAL at 21:15

## 2024-08-20 RX ADMIN — Medication 10 MILLIEQUIVALENT(S): at 12:37

## 2024-08-20 RX ADMIN — Medication 12.5 MILLIGRAM(S): at 21:15

## 2024-08-20 RX ADMIN — ENOXAPARIN SODIUM 40 MILLIGRAM(S): 100 INJECTION SUBCUTANEOUS at 11:48

## 2024-08-20 NOTE — PROGRESS NOTE ADULT - ASSESSMENT
a/p:  #Alzheimer's Dementia  #inability to care for self at home (lives alone)  #Hypokalemia  -dispo planning---peer to peer completed today----denied for STR  -will likely need LT care---case managment aware and following  -fall precautions  -k 3.3 today---suppl K and continue to monitor electrolytes  -no events on telemetry---will dc tele and transfer to regular med floor pending dispo plan  -continue with home meds  -hold HCTZ   -skin assessment and care as per the nursing protocol   -PT following    DVT/GI ppx  FULL CODE--continue to readdress goc with family     Total time spent to complete patient's bedside assessment, review medical chart, discuss medical plan of care with covering medical team was more than 50 minutes  with >50% of time spendt face to face with patient, discussion with patient/family and/or coordination of care including completion of peer to peer

## 2024-08-21 PROCEDURE — 99231 SBSQ HOSP IP/OBS SF/LOW 25: CPT

## 2024-08-21 RX ADMIN — DONEPEZIL HYDROCHLORIDE 20 MILLIGRAM(S): 5 TABLET, FILM COATED ORAL at 22:26

## 2024-08-21 RX ADMIN — Medication 20 MILLIGRAM(S): at 22:27

## 2024-08-21 RX ADMIN — ENOXAPARIN SODIUM 40 MILLIGRAM(S): 100 INJECTION SUBCUTANEOUS at 11:49

## 2024-08-21 RX ADMIN — Medication 12.5 MILLIGRAM(S): at 22:25

## 2024-08-21 RX ADMIN — SERTRALINE HYDROCHLORIDE 100 MILLIGRAM(S): 50 TABLET, FILM COATED ORAL at 11:49

## 2024-08-21 NOTE — PROGRESS NOTE ADULT - ATTENDING COMMENTS
#Inabilitty to care for self  lives alone, found wandering neighborhood  in setting of dementia  cont zoloft 100  seroquel 12.5 qhs  needs outpt psych f/u  PT  case management eval for snf    #Progress Note Handoff  Pending (specify): case management, d/c planning to snf  Family discussion:   Disposition: snf
79yr old female with History of Alzheimer's Dementia, HTN, PVD, CHB s/p pacemaker, who is presenting for placement.      #HO Alzheimers dementia   - pleasant   - c/w donepezil 25 mg qhs  - c/w seroquel 12.5 mg bedtime  - c/w zyprexa prn agitation   - f/u social work c/s for placement   - PT/OT recommended [facility]     #HTN   #HLD   - c/w statin  - d/c amlodipine and HCTZ     #CHB s/p pacemaker   - EKG paced rhythm       - DVT ppx: Lovenox     Pending:  placement

## 2024-08-21 NOTE — PROGRESS NOTE ADULT - ASSESSMENT
Patient is a 79yr old female with History of Alzheimer's Dementia, HTN, PVD, CHB s/p pacemaker, who is presenting for placement.    #Placement   #HO Alzheimers dementia   - HD stable, afebrile  - c/w donepezil 25 mg qhs  - c/w seroquel 12.5 mg bedtime  - c/w zyprexa prn agitation   - f/u social work c/s for placement   - PT/OT recommended [facility]     #HTN   #PVD   - c/w Norvasc and HCTZ  - c/w potassium supplementation   - c/w statin  - d/c amlodipine, soft pressures    #CHB s/p pacemaker   - EKG paced rhythm    #MISC  - Diet: DASH   - DVT ppx: Lovenox  - GI ppx: not needed    Pending: dc pending placement     Patient is a 79yr old female with History of Alzheimer's Dementia, HTN, PVD, CHB s/p pacemaker, who is presenting for placement.      #HO Alzheimers dementia   - HD stable, afebrile  - c/w donepezil 25 mg qhs  - c/w seroquel 12.5 mg bedtime  - c/w zyprexa prn agitation   - f/u social work c/s for placement   - PT/OT recommended [facility]     #HTN   #PVD   - c/w Norvasc and HCTZ  - c/w potassium supplementation   - c/w statin  - d/c amlodipine, soft pressures    #CHB s/p pacemaker   - EKG paced rhythm    #MISC  - Diet: DASH   - DVT ppx: Lovenox  - GI ppx: not needed    Pending: dc pending placement

## 2024-08-22 PROCEDURE — 99231 SBSQ HOSP IP/OBS SF/LOW 25: CPT

## 2024-08-22 RX ORDER — CHLORHEXIDINE GLUCONATE 40 MG/ML
1 SOLUTION TOPICAL
Refills: 0 | Status: DISCONTINUED | OUTPATIENT
Start: 2024-08-22 | End: 2024-08-23

## 2024-08-22 RX ADMIN — ENOXAPARIN SODIUM 40 MILLIGRAM(S): 100 INJECTION SUBCUTANEOUS at 12:46

## 2024-08-22 RX ADMIN — SERTRALINE HYDROCHLORIDE 100 MILLIGRAM(S): 50 TABLET, FILM COATED ORAL at 12:46

## 2024-08-22 RX ADMIN — Medication 12.5 MILLIGRAM(S): at 21:32

## 2024-08-22 RX ADMIN — CHLORHEXIDINE GLUCONATE 1 APPLICATION(S): 40 SOLUTION TOPICAL at 12:47

## 2024-08-22 RX ADMIN — DONEPEZIL HYDROCHLORIDE 20 MILLIGRAM(S): 5 TABLET, FILM COATED ORAL at 21:32

## 2024-08-22 RX ADMIN — Medication 20 MILLIGRAM(S): at 21:31

## 2024-08-22 NOTE — DIETITIAN INITIAL EVALUATION ADULT - ADD RECOMMEND
1. ADD Ensure Plus HP 3X/DAILY to optimize kcal/pro intake -- provides 1050 kcal, 60g pro total  2. Continue with current diet order   3. Encourage PO intake and assist during meals prn    High risk f/u in 3 days or prn.

## 2024-08-22 NOTE — DIETITIAN INITIAL EVALUATION ADULT - PERTINENT MEDS FT
MEDICATIONS  (STANDING):  chlorhexidine 2% Cloths 1 Application(s) Topical <User Schedule>  donepezil 20 milliGRAM(s) Oral at bedtime  enoxaparin Injectable 40 milliGRAM(s) SubCutaneous every 24 hours  QUEtiapine 12.5 milliGRAM(s) Oral at bedtime  sertraline 100 milliGRAM(s) Oral daily  simvastatin 20 milliGRAM(s) Oral at bedtime    MEDICATIONS  (PRN):

## 2024-08-22 NOTE — DIETITIAN INITIAL EVALUATION ADULT - NAME AND PHONE
Martha [id number] or TEAMS    Nutrition Interventions: Meals, snacks, supplements; Nutrition Monitoring: Diet order, PO intake, weights, labs, NFPF, body composition, BM and tolerance to medical food supplements

## 2024-08-22 NOTE — DIETITIAN NUTRITION RISK NOTIFICATION - TREATMENT: THE FOLLOWING DIET HAS BEEN RECOMMENDED
Diet, DASH/TLC:   Sodium & Cholesterol Restricted  Supplement Feeding Modality:  Oral  Ensure Plus High Protein Cans or Servings Per Day:  1       Frequency:  Three Times a day (08-22-24 @ 13:35) [Pending Verification By Attending]  Diet, DASH/TLC:   Sodium & Cholesterol Restricted (08-15-24 @ 18:53) [Active]

## 2024-08-22 NOTE — DIETITIAN INITIAL EVALUATION ADULT - EDUCATION DIETARY MODIFICATIONS
Discussed importance of PO intake and supplements -- will need to reassess cognitive status./(0) unable to meet; needs instruction/verbalization

## 2024-08-22 NOTE — DIETITIAN INITIAL EVALUATION ADULT - OTHER CALCULATIONS
Using ABW: ENERGY: 0376-0011 kcal/day (30-35 kcal/kg); PROTEIN: 65-81 g/day (1.2-1.5 g/kg); FLUID: 9807-8363 mL/day (25-30 mL/kg) -- with consideration for age, BMI, malnutrition

## 2024-08-22 NOTE — DIETITIAN INITIAL EVALUATION ADULT - OTHER INFO
Pertinent Medical Information: Per H&P, pt is a 78 y/o female with PMHx of Alzheimer's Dementia, HTN, PVD, CHB s/p pacemaker presenting for placement.    Pertinent Subjective Information: Unable to observed breakfast tray at bedside. Per flow sheet, pt has varying appetite; consuming between 26-75% of meals provided in-house. Tolerating diet texture/consistency well. No nausea or vomiting reported.     Weight hx: Pt does not recall UBW. Current dosing weight is 54.2 KG. Previous admission weight was 60.8 KG on 4/1/24 in EMR. 11% unintentional weight loss in over 4 months compared to current dosing weight. Pt meets weight criteria for malnutrition at this time.

## 2024-08-22 NOTE — DIETITIAN INITIAL EVALUATION ADULT - ORAL INTAKE PTA/DIET HISTORY
Pt confused per flow sheet, however responded to questions appropriately this morning. Will need to confirm accuracy of nutrition hx at follow up as able.     Pt reports having a good appetite prior to admit; consumed 3 meals daily. Pt takes a multivitamin daily. Denies drinking protein shake supplements. NKFA; denies any restrictive cultural or Mosque food preferences. No chewing or swallowing difficulties noted.

## 2024-08-22 NOTE — PROGRESS NOTE ADULT - ASSESSMENT
79yr old female with History of Alzheimer's Dementia, HTN, PVD, CHB s/p pacemaker, who is presenting for placement.      #HO Alzheimers dementia   - pleasant   - c/w donepezil 25 mg qhs  - c/w seroquel 12.5 mg bedtime  - c/w zyprexa prn agitation   - f/u social work c/s for placement   - PT/OT recommended [facility]     #HTN   #HLD   - c/w statin  - discontinued amlodipine and HCTZ     #CHB s/p pacemaker   - EKG paced rhythm       DVT ppx: Lovenox     Pending:  placement

## 2024-08-23 ENCOUNTER — TRANSCRIPTION ENCOUNTER (OUTPATIENT)
Age: 79
End: 2024-08-23

## 2024-08-23 VITALS
DIASTOLIC BLOOD PRESSURE: 62 MMHG | RESPIRATION RATE: 18 BRPM | SYSTOLIC BLOOD PRESSURE: 121 MMHG | HEART RATE: 77 BPM | TEMPERATURE: 98 F | OXYGEN SATURATION: 99 %

## 2024-08-23 PROCEDURE — 99238 HOSP IP/OBS DSCHRG MGMT 30/<: CPT

## 2024-08-23 RX ORDER — SERTRALINE HYDROCHLORIDE 50 MG/1
1 TABLET, FILM COATED ORAL
Qty: 0 | Refills: 0 | DISCHARGE
Start: 2024-08-23

## 2024-08-23 RX ADMIN — CHLORHEXIDINE GLUCONATE 1 APPLICATION(S): 40 SOLUTION TOPICAL at 05:16

## 2024-08-23 RX ADMIN — SERTRALINE HYDROCHLORIDE 100 MILLIGRAM(S): 50 TABLET, FILM COATED ORAL at 11:13

## 2024-08-23 RX ADMIN — ENOXAPARIN SODIUM 40 MILLIGRAM(S): 100 INJECTION SUBCUTANEOUS at 11:13

## 2024-08-23 NOTE — DISCHARGE NOTE PROVIDER - CARE PROVIDERS DIRECT ADDRESSES
[email or web address]

## 2024-08-23 NOTE — DISCHARGE NOTE PROVIDER - CARE PROVIDER_API CALL
Saleem Waters  [address] [address]  Phone: [phone]  Fax: [phone]  Follow Up Time: Routine

## 2024-08-23 NOTE — PROGRESS NOTE ADULT - SUBJECTIVE AND OBJECTIVE BOX
ALBANIA ARROYO  79y  Female      Patient is a 79y old  Female who presents with a chief complaint of Placement (16 Aug 2024 16:21)      INTERVAL HPI/OVERNIGHT EVENTS:    pt seen this am   -medically stable for d/c planning   -CM to assist with NH placement     Vital Signs Last 24 Hrs  T(C): 36.7 (18 Aug 2024 12:53), Max: 37.1 (17 Aug 2024 20:15)  T(F): 98.1 (18 Aug 2024 12:53), Max: 98.8 (17 Aug 2024 20:15)  HR: 75 (18 Aug 2024 12:53) (67 - 87)  BP: 115/68 (18 Aug 2024 12:53) (115/68 - 148/75)  BP(mean): --  RR: 18 (18 Aug 2024 12:53) (18 - 18)    PHYSICAL EXAM:  GENERAL: Elderly not in distress - resting comfortably in bed   HEAD:  Atraumatic, Normocephalic  EYES: EOMI, PERRLA, conjunctiva and sclera clear  NERVOUS SYSTEM:  awake and not alert (thinks she is home, oriented to person only)  CHEST/LUNG: Clear  air entry  CV/HEART: Regular rate and rhythm  GI/ABDOMEN: Soft abdomen     LAB:                        13.3   6.96  )-----------( 181      ( 16 Aug 2024 06:51 )             39.9     08-16    140  |  104  |  16  ----------------------------<  97  3.8   |  23  |  0.8    Ca    9.3      16 Aug 2024 06:51  Mg     2.0     08-16          Daily Height in cm: 167.64 (16 Aug 2024 21:03)    Daily   CAPILLARY BLOOD GLUCOSE      POCT Blood Glucose.: 94 mg/dL (17 Aug 2024 11:02)    Urinalysis Basic - ( 16 Aug 2024 06:51 )    Color: x / Appearance: x / SG: x / pH: x  Gluc: 97 mg/dL / Ketone: x  / Bili: x / Urobili: x   Blood: x / Protein: x / Nitrite: x   Leuk Esterase: x / RBC: x / WBC x   Sq Epi: x / Non Sq Epi: x / Bacteria: x            RADIOLOGY:    Imaging Personally visualized and Reviewed:  [ y ] YES  [ ] NO      amLODIPine   Tablet 5 milliGRAM(s) Oral daily  donepezil 20 milliGRAM(s) Oral at bedtime  enoxaparin Injectable 40 milliGRAM(s) SubCutaneous every 24 hours  hydrochlorothiazide 25 milliGRAM(s) Oral daily  potassium chloride    Tablet ER 10 milliEquivalent(s) Oral daily  QUEtiapine 12.5 milliGRAM(s) Oral at bedtime  sertraline 100 milliGRAM(s) Oral daily  simvastatin 20 milliGRAM(s) Oral at bedtime      
SUBJECTIVE/OVERNIGHT EVENTS  Today is hospital day 1d. This morning patient was seen and examined at bedside, resting comfortably in bed.     Overnight pt agitated and tried to leave. was reionrented and calmed by security     MEDICATIONS  STANDING MEDICATIONS  amLODIPine   Tablet 5 milliGRAM(s) Oral daily  donepezil 20 milliGRAM(s) Oral at bedtime  enoxaparin Injectable 40 milliGRAM(s) SubCutaneous every 24 hours  hydrochlorothiazide 25 milliGRAM(s) Oral daily  potassium chloride    Tablet ER 10 milliEquivalent(s) Oral daily  QUEtiapine 12.5 milliGRAM(s) Oral at bedtime  sertraline 100 milliGRAM(s) Oral daily  simvastatin 20 milliGRAM(s) Oral at bedtime    PRN MEDICATIONS    VITALS  T(F): 98.3 (08-16-24 @ 15:48), Max: 98.4 (08-16-24 @ 08:27)  HR: 73 (08-16-24 @ 15:48) (64 - 73)  BP: 146/67 (08-16-24 @ 15:48) (122/76 - 158/74)  RR: 18 (08-16-24 @ 15:48) (18 - 18)  SpO2: 99% (08-16-24 @ 15:48) (97% - 99%)    PHYSICAL EXAM      GENERAL: NAD, lying in bed comfortably  HEAD:  Atraumatic, normocephalic  EYES: EOMI, PERRLA, conjunctiva and sclera clear  ENT: Moist mucous membranes  NECK: Supple, no JVD  HEART: Regular rate and rhythm, no murmurs, rubs, or gallops  LUNGS: Unlabored respirations.  Clear to auscultation bilaterally, no crackles, wheezing, or rhonchi  ABDOMEN: Soft, nontender, nondistended, +BS  EXTREMITIES: 2+ peripheral pulses bilaterally. No clubbing, cyanosis, or edema  NERVOUS SYSTEM:  A&Ox3, no focal deficits   SKIN: No rashes or lesions    LABS             13.3   6.96  )-----------( 181      ( 08-16-24 @ 06:51 )             39.9     140  |  104  |  16  -------------------------<  97   08-16-24 @ 06:51  3.8  |  23  |  0.8    Ca      9.3     08-16-24 @ 06:51  Mg     2.0     08-16-24 @ 06:51    TPro  6.8  /  Alb  4.6  /  TBili  0.7  /  DBili  x   /  AST  17  /  ALT  10  /  AlkPhos  99  /  GGT  x     08-15-24 @ 14:40      Troponin T, High Sensitivity Result: 13 ng/L (08-16-24 @ 06:51)  Troponin T, High Sensitivity Result: 16 ng/L (08-15-24 @ 16:50)  Troponin T, High Sensitivity Result: 12 ng/L (08-15-24 @ 14:40)    Urinalysis Basic - ( 16 Aug 2024 06:51 )    Color: x / Appearance: x / SG: x / pH: x  Gluc: 97 mg/dL / Ketone: x  / Bili: x / Urobili: x   Blood: x / Protein: x / Nitrite: x   Leuk Esterase: x / RBC: x / WBC x   Sq Epi: x / Non Sq Epi: x / Bacteria: x          Urinalysis with Rflx Culture (collected 15 Aug 2024 17:22)      IMAGING
SUBJECTIVE/OVERNIGHT EVENTS  Today is hospital day 3d. This morning patient was seen and examined at bedside, resting comfortably in bed. No acute or major events overnight. No complaints at this time.    CODE STATUS: FULL    MEDICATIONS  STANDING MEDICATIONS  amLODIPine   Tablet 5 milliGRAM(s) Oral daily  donepezil 20 milliGRAM(s) Oral at bedtime  enoxaparin Injectable 40 milliGRAM(s) SubCutaneous every 24 hours  hydrochlorothiazide 25 milliGRAM(s) Oral daily  potassium chloride    Tablet ER 10 milliEquivalent(s) Oral daily  QUEtiapine 12.5 milliGRAM(s) Oral at bedtime  sertraline 100 milliGRAM(s) Oral daily  simvastatin 20 milliGRAM(s) Oral at bedtime    VITALS  T(F): 98.8 (08-17-24 @ 20:15), Max: 98.8 (08-17-24 @ 20:15)  HR: 87 (08-17-24 @ 20:15) (72 - 87)  BP: 131/77 (08-17-24 @ 20:15) (131/77 - 155/75)  RR: 18 (08-17-24 @ 20:15) (18 - 18)  SpO2: --  POCT Blood Glucose.: 94 mg/dL (08-17-24 @ 11:02)      LABS             13.3   6.96  )-----------( 181      ( 08-16-24 @ 06:51 )             39.9     140  |  104  |  16  -------------------------<  97   08-16-24 @ 06:51  3.8  |  23  |  0.8    Ca      9.3     08-16-24 @ 06:51  Mg     2.0     08-16-24 @ 06:51    Troponin T, High Sensitivity Result: 13 ng/L (08-16-24 @ 06:51)  Troponin T, High Sensitivity Result: 16 ng/L (08-15-24 @ 16:50)  Troponin T, High Sensitivity Result: 12 ng/L (08-15-24 @ 14:40)    Urinalysis Basic - ( 16 Aug 2024 06:51 )    Color: x / Appearance: x / SG: x / pH: x  Gluc: 97 mg/dL / Ketone: x  / Bili: x / Urobili: x   Blood: x / Protein: x / Nitrite: x   Leuk Esterase: x / RBC: x / WBC x   Sq Epi: x / Non Sq Epi: x / Bacteria: x    Urinalysis with Rflx Culture (collected 15 Aug 2024 17:22)    Culture - Urine (collected 15 Aug 2024 17:22)  Source: Clean Catch None  Final Report (17 Aug 2024 19:37):    50,000 - 99,000 CFU/mL Streptococcus agalactiae (Group B)    Streptococcus agalactiae (Group B) isolated    Group B streptococci are susceptible to ampicillin,    penicillin and cefazolin, but may be resistant to    erythromycin and clindamycin.    <10,000 CFU/ml Normal Urogenital leyda present    
  ALBANIA ARROYO  79y  Female      Patient is a 79y old  Female who presents with a chief complaint of Placement (16 Aug 2024 16:21)      INTERVAL HPI/OVERNIGHT EVENTS:    pt seen this am   -medically stable for d/c planning   -d/c telemetry  -spoke to daughter at bedside (agreeable for NH placement)  -CM to assist with NH placement     Vital Signs Last 24 Hrs  T(C): 36.6 (19 Aug 2024 12:55), Max: 37.4 (18 Aug 2024 20:15)  T(F): 97.9 (19 Aug 2024 12:55), Max: 99.4 (18 Aug 2024 20:15)  HR: 71 (19 Aug 2024 12:55) (69 - 85)  BP: 110/68 (19 Aug 2024 12:55) (106/68 - 130/76)  BP(mean): 82 (19 Aug 2024 12:55) (82 - 82)  RR: 18 (19 Aug 2024 12:55) (18 - 18)  SpO2: 98% (19 Aug 2024 12:55) (98% - 98%)    Parameters below as of 19 Aug 2024 12:55  Patient On (Oxygen Delivery Method): room air    PHYSICAL EXAM:  GENERAL: Elderly not in distress - resting comfortably in bed   HEAD:  Atraumatic, Normocephalic  EYES: EOMI, PERRLA, conjunctiva and sclera clear  NERVOUS SYSTEM:  awake and not alert (thinks she is home, oriented to person only)  CHEST/LUNG: Clear  air entry  CV/HEART: Regular rate and rhythm  GI/ABDOMEN: Soft abdomen     LAB:                                 13.2   6.59  )-----------( 227      ( 19 Aug 2024 08:04 )             39.2               08-19    143  |  104  |  21<H>  ----------------------------<  95  3.3<L>   |  26  |  0.9    Ca    9.5      19 Aug 2024 08:04  Mg     1.8     08-19    TPro  6.2  /  Alb  4.2  /  TBili  0.6  /  DBili  x   /  AST  14  /  ALT  8   /  AlkPhos  92  08-19      Daily Height in cm: 167.64 (16 Aug 2024 21:03)    Daily   CAPILLARY BLOOD GLUCOSE      POCT Blood Glucose.: 94 mg/dL (17 Aug 2024 11:02)    Urinalysis Basic - ( 16 Aug 2024 06:51 )    Color: x / Appearance: x / SG: x / pH: x  Gluc: 97 mg/dL / Ketone: x  / Bili: x / Urobili: x   Blood: x / Protein: x / Nitrite: x   Leuk Esterase: x / RBC: x / WBC x   Sq Epi: x / Non Sq Epi: x / Bacteria: x    RADIOLOGY:    Imaging Personally visualized and Reviewed:  [ y ] YES  [ ] NO      amLODIPine   Tablet 5 milliGRAM(s) Oral daily  donepezil 20 milliGRAM(s) Oral at bedtime  enoxaparin Injectable 40 milliGRAM(s) SubCutaneous every 24 hours  hydrochlorothiazide 25 milliGRAM(s) Oral daily  potassium chloride    Tablet ER 10 milliEquivalent(s) Oral daily  QUEtiapine 12.5 milliGRAM(s) Oral at bedtime  sertraline 100 milliGRAM(s) Oral daily  simvastatin 20 milliGRAM(s) Oral at bedtime      
24H events:    Patient is a 79y old Female who presents with a chief complaint of Placement (22 Aug 2024 16:44)    Primary diagnosis of Hospital admission due to social situation          Today is hospital day 7d.   This morning patient was seen and examined at bedside, resting comfortably in bed. Stable mental status. Denies any complaints/symptoms.   No acute or major events overnight.      ALLERGIES:  No Known Allergies    MEDICATIONS:  STANDING MEDICATIONS  chlorhexidine 2% Cloths 1 Application(s) Topical <User Schedule>  donepezil 20 milliGRAM(s) Oral at bedtime  enoxaparin Injectable 40 milliGRAM(s) SubCutaneous every 24 hours  QUEtiapine 12.5 milliGRAM(s) Oral at bedtime  sertraline 100 milliGRAM(s) Oral daily  simvastatin 20 milliGRAM(s) Oral at bedtime    PRN MEDICATIONS    VITALS:   T(F): 98.3  HR: 78  BP: 123/73  RR: 18  SpO2: 95%    PHYSICAL EXAM:  General: Not in acute distress  Cardiac: regular rate and rhythm, no murmurs  Lungs: CTA, nonlabored respirations  Abdomen: soft, nontender, active bs  Neuro: AOx1-2, no focal deficits  Extremities: No edema, no cyanosis    
SUBJECTIVE/OVERNIGHT EVENTS  Today is hospital day 4d. This morning patient was seen and examined at bedside, resting comfortably in bed. No acute or major events overnight.    HOSPITAL COURSE  Day 1:   Day 2:   Day 3:     CODE STATUS:    FAMILY COMMUNICATION  Contact date:  Name of person contacted:  Relationship to patient:  Communication details:    MEDICATIONS  STANDING MEDICATIONS  amLODIPine   Tablet 5 milliGRAM(s) Oral daily  donepezil 20 milliGRAM(s) Oral at bedtime  enoxaparin Injectable 40 milliGRAM(s) SubCutaneous every 24 hours  hydrochlorothiazide 25 milliGRAM(s) Oral daily  potassium chloride    Tablet ER 10 milliEquivalent(s) Oral daily  QUEtiapine 12.5 milliGRAM(s) Oral at bedtime  sertraline 100 milliGRAM(s) Oral daily  simvastatin 20 milliGRAM(s) Oral at bedtime    PRN MEDICATIONS    VITALS  T(F): 97.9 (08-19-24 @ 12:55), Max: 99.4 (08-18-24 @ 20:15)  HR: 71 (08-19-24 @ 12:55) (69 - 85)  BP: 110/68 (08-19-24 @ 12:55) (106/68 - 130/76)  RR: 18 (08-19-24 @ 12:55) (18 - 18)  SpO2: 98% (08-19-24 @ 12:55) (98% - 98%)    PHYSICAL EXAM  GENERAL  ( x ) NAD, lying in bed comfortably     (  ) obtunded     (  ) lethargic     (  ) somnolent    HEAD  ( x ) Atraumatic     (  ) hematoma     (  ) laceration (specify location:       )     NECK  ( x ) Supple     (  ) neck stiffness     (  ) nuchal rigidity     (  )  no JVD     (  ) JVD present ( -- cm)    HEART  Rate -->  ( x ) normal rate    (  ) bradycardic    (  ) tachycardic  Rhythm -->  ( x ) regular    (  ) regularly irregular    (  ) irregularly irregular  Murmurs -->  ( x ) normal s1/s2    (  ) systolic murmur    (  ) diastolic murmur    (  ) continuous murmur     (  ) S3 present    (  ) S4 present    LUNGS  ( x )Unlabored respirations     (  ) tachypnea  ( x ) B/L air entry     (  ) decreased breath sounds in:  (location     )    (  ) no adventitious sound     (  ) crackles     (  ) wheezing      (  ) rhonchi      (specify location:       )  (  ) chest wall tenderness (specify location:       )    ABDOMEN  ( x ) Soft     (  ) tense   |   (  ) nondistended     (  ) distended   |   (  ) +BS     (  ) hypoactive bowel sounds     (  ) hyperactive bowel sounds  ( x ) nontender     (  ) RUQ tenderness     (  ) RLQ tenderness     (  ) LLQ tenderness     (  ) epigastric tenderness     (  ) diffuse tenderness  (  ) Splenomegaly      (  ) Hepatomegaly      (  ) Jaundice     (  ) ecchymosis     EXTREMITIES  ( x ) Normal     (  ) Rash     (  ) ecchymosis     (  ) varicose veins      (  ) pitting edema     (  ) non-pitting edema   (  ) ulceration     (  ) gangrene:     (location:     )    NERVOUS SYSTEM  ( x ) A&Ox3     (  ) confused     (  ) lethargic  CN II-XII:     (  ) Intact     (  ) focal deficits  (Specify:     )   Upper extremities:     (  ) strength X/5     (  ) focal deficit (specify:    )  Lower extremities:     (  ) strength  X/5    (  ) focal deficit (specify:    )    SKIN  (  ) No rashes or lesions     (  ) maculopapular rash     (  ) pustules     (  ) vesicles     (  ) ulcer     (  ) ecchymosis     (specify location:     )        LABS             13.2   6.59  )-----------( 227      ( 08-19-24 @ 08:04 )             39.2     143  |  104  |  21  -------------------------<  95   08-19-24 @ 08:04  3.3  |  26  |  0.9    Ca      9.5     08-19-24 @ 08:04  Mg     1.8     08-19-24 @ 08:04    TPro  6.2  /  Alb  4.2  /  TBili  0.6  /  DBili  x   /  AST  14  /  ALT  8   /  AlkPhos  92  /  GGT  x     08-19-24 @ 08:04        Urinalysis Basic - ( 19 Aug 2024 08:04 )    Color: x / Appearance: x / SG: x / pH: x  Gluc: 95 mg/dL / Ketone: x  / Bili: x / Urobili: x   Blood: x / Protein: x / Nitrite: x   Leuk Esterase: x / RBC: x / WBC x   Sq Epi: x / Non Sq Epi: x / Bacteria: x          IMAGING
SUBJECTIVE/OVERNIGHT EVENTS  Today is hospital day 5d. This morning patient was seen and examined at bedside, resting comfortably in bed. No acute or major events overnight. Able to state her name and that she is in a hospital, but incorrect about the month, does not know the president. Denies any complaints today.     MEDICATIONS:  STANDING MEDICATIONS  amLODIPine   Tablet 5 milliGRAM(s) Oral daily  donepezil 20 milliGRAM(s) Oral at bedtime  enoxaparin Injectable 40 milliGRAM(s) SubCutaneous every 24 hours  QUEtiapine 12.5 milliGRAM(s) Oral at bedtime  sertraline 100 milliGRAM(s) Oral daily  simvastatin 20 milliGRAM(s) Oral at bedtime    PRN MEDICATIONS    VITALS:   T(F): 98.2  HR: 66  BP: 117/76  RR: 18  SpO2: 95%      PHYSICAL EXAM  General: Not in acute distress  Cardiac: regular rate and rhythm, no murmurs  Lungs: CTA, nonlabored respirations  Abdomen: soft, nontender, active bs  Neuro: AOx1-2, no focal deficits  Extremities: No edema, no cyanosis    
SUBJECTIVE/OVERNIGHT EVENTS  Today is hospital day 4d. This morning patient was seen and examined at bedside, resting comfortably in bed. No acute or major events overnight.  Patient has no new complaints. A&Ox1 today.      MEDICATIONS  STANDING MEDICATIONS  amLODIPine   Tablet 5 milliGRAM(s) Oral daily  donepezil 20 milliGRAM(s) Oral at bedtime  enoxaparin Injectable 40 milliGRAM(s) SubCutaneous every 24 hours  hydrochlorothiazide 25 milliGRAM(s) Oral daily  potassium chloride    Tablet ER 10 milliEquivalent(s) Oral daily  QUEtiapine 12.5 milliGRAM(s) Oral at bedtime  sertraline 100 milliGRAM(s) Oral daily  simvastatin 20 milliGRAM(s) Oral at bedtime    PRN MEDICATIONS    VITALS  T(F): 97.9 (08-19-24 @ 12:55), Max: 99.4 (08-18-24 @ 20:15)  HR: 71 (08-19-24 @ 12:55) (69 - 85)  BP: 110/68 (08-19-24 @ 12:55) (106/68 - 130/76)  RR: 18 (08-19-24 @ 12:55) (18 - 18)  SpO2: 98% (08-19-24 @ 12:55) (98% - 98%)    PHYSICAL EXAM  GENERAL  ( x ) NAD, lying in bed comfortably     (  ) obtunded     (  ) lethargic     (  ) somnolent    HEAD  ( x ) Atraumatic     (  ) hematoma     (  ) laceration (specify location:       )     NECK  ( x ) Supple     (  ) neck stiffness     (  ) nuchal rigidity     (  )  no JVD     (  ) JVD present ( -- cm)    HEART  Rate -->  ( x ) normal rate    (  ) bradycardic    (  ) tachycardic  Rhythm -->  ( x ) regular    (  ) regularly irregular    (  ) irregularly irregular  Murmurs -->  ( x ) normal s1/s2    (  ) systolic murmur    (  ) diastolic murmur    (  ) continuous murmur     (  ) S3 present    (  ) S4 present    LUNGS  ( x )Unlabored respirations     (  ) tachypnea  ( x ) B/L air entry     (  ) decreased breath sounds in:  (location     )    (  ) no adventitious sound     (  ) crackles     (  ) wheezing      (  ) rhonchi      (specify location:       )  (  ) chest wall tenderness (specify location:       )    ABDOMEN  ( x ) Soft     (  ) tense   |   (  ) nondistended     (  ) distended   |   (  ) +BS     (  ) hypoactive bowel sounds     (  ) hyperactive bowel sounds  ( x ) nontender     (  ) RUQ tenderness     (  ) RLQ tenderness     (  ) LLQ tenderness     (  ) epigastric tenderness     (  ) diffuse tenderness  (  ) Splenomegaly      (  ) Hepatomegaly      (  ) Jaundice     (  ) ecchymosis     EXTREMITIES  ( x ) Normal     (  ) Rash     (  ) ecchymosis     (  ) varicose veins      (  ) pitting edema     (  ) non-pitting edema   (  ) ulceration     (  ) gangrene:     (location:     )    NERVOUS SYSTEM  ( x ) A&Ox3     (  ) confused     (  ) lethargic  CN II-XII:     (  ) Intact     (  ) focal deficits  (Specify:     )   Upper extremities:     (  ) strength X/5     (  ) focal deficit (specify:    )  Lower extremities:     (  ) strength  X/5    (  ) focal deficit (specify:    )    SKIN  (  ) No rashes or lesions     (  ) maculopapular rash     (  ) pustules     (  ) vesicles     (  ) ulcer     (  ) ecchymosis     (specify location:     )        LABS             13.2   6.59  )-----------( 227      ( 08-19-24 @ 08:04 )             39.2     143  |  104  |  21  -------------------------<  95   08-19-24 @ 08:04  3.3  |  26  |  0.9    Ca      9.5     08-19-24 @ 08:04  Mg     1.8     08-19-24 @ 08:04    TPro  6.2  /  Alb  4.2  /  TBili  0.6  /  DBili  x   /  AST  14  /  ALT  8   /  AlkPhos  92  /  GGT  x     08-19-24 @ 08:04        Urinalysis Basic - ( 19 Aug 2024 08:04 )    Color: x / Appearance: x / SG: x / pH: x  Gluc: 95 mg/dL / Ketone: x  / Bili: x / Urobili: x   Blood: x / Protein: x / Nitrite: x   Leuk Esterase: x / RBC: x / WBC x   Sq Epi: x / Non Sq Epi: x / Bacteria: x          IMAGING
   Pt seen and examined at bedside. Awake, alert. No complaints.     VITAL SIGNS (Last 24 hrs):  T(C): 36.8 (08-22-24 @ 12:45), Max: 37.3 (08-21-24 @ 20:49)  HR: 78 (08-22-24 @ 12:45) (78 - 85)  BP: 123/73 (08-22-24 @ 12:45) (105/63 - 123/73)  RR: 18 (08-22-24 @ 12:45) (18 - 18)  SpO2: 95% (08-22-24 @ 12:45) (95% - 99%)  Wt(kg): --  Daily Height in cm: 167.6 (22 Aug 2024 13:17)    Daily     I&O's Summary      PHYSICAL EXAM:  GENERAL: NAD, well-developed  HEAD:  Atraumatic, Normocephalic  EYES:   conjunctiva and sclera clear  NECK: Supple, No JVD  CHEST/LUNG: Clear to auscultation bilaterally; No wheeze  HEART: Regular rate and rhythm; No murmurs, rubs, or gallops  ABDOMEN: Soft, Nontender, Nondistended; Bowel sounds present  EXTREMITIES:  2+ Peripheral Pulses, No clubbing, cyanosis, or edema  SKIN: No rashes or lesions         CBC Full  -  ( 19 Aug 2024 08:04 )  WBC Count : 6.59 K/uL  Hemoglobin : 13.2 g/dL  Hematocrit : 39.2 %  Platelet Count - Automated : 227 K/uL  Mean Cell Volume : 88.3 fL  Mean Cell Hemoglobin : 29.7 pg  Mean Cell Hemoglobin Concentration : 33.7 g/dL  Auto Neutrophil # : 3.63 K/uL  Auto Lymphocyte # : 2.29 K/uL  Auto Monocyte # : 0.49 K/uL  Auto Eosinophil # : 0.13 K/uL  Auto Basophil # : 0.04 K/uL  Auto Neutrophil % : 55.1 %  Auto Lymphocyte % : 34.7 %  Auto Monocyte % : 7.4 %  Auto Eosinophil % : 2.0 %  Auto Basophil % : 0.6 %    BMP:    08-19 @ 08:04    Blood Urea Nitrogen - 21  Calcium - 9.5  Carbond Dioxide - 26  Chloride - 104  Creatinine - 0.9  Glucose - 95  Potassium - 3.3  Sodium - 143      Hemoglobin A1c -     Urine Culture:            MEDICATIONS  (STANDING):  chlorhexidine 2% Cloths 1 Application(s) Topical <User Schedule>  donepezil 20 milliGRAM(s) Oral at bedtime  enoxaparin Injectable 40 milliGRAM(s) SubCutaneous every 24 hours  QUEtiapine 12.5 milliGRAM(s) Oral at bedtime  sertraline 100 milliGRAM(s) Oral daily  simvastatin 20 milliGRAM(s) Oral at bedtime    MEDICATIONS  (PRN):  
  [place] ARROYO  79y  [id number]      Patient is a 79y old  Female who presents with a chief complaint of Placement (22 Aug 2024 18:39)      INTERVAL HPI/OVERNIGHT EVENTS:        REVIEW OF SYSTEMS:        FAMILY HISTORY:  Family history of diabetes mellitus (DM) (Father, Mother)      T(C): 36.4 (08-23-24 @ 04:51), Max: 36.9 (08-22-24 @ 19:48)  HR: 70 (08-23-24 @ 04:51) (70 - 90)  BP: 94/54 (08-23-24 @ 04:51) (94/54 - 123/73)  RR: 18 (08-23-24 @ 04:51) (18 - 18)  SpO2: 98% (08-23-24 @ 04:51) (95% - 99%)  Wt(kg): --Vital Signs Last 24 Hrs  T(C): 36.4 (23 Aug 2024 04:51), Max: 36.9 (22 Aug 2024 19:48)  T(F): 97.6 (23 Aug 2024 04:51), Max: 98.4 (22 Aug 2024 19:48)  HR: 70 (23 Aug 2024 04:51) (70 - 90)  BP: 94/54 (23 Aug 2024 04:51) (94/54 - 123/73)  BP(mean): --  RR: 18 (23 Aug 2024 04:51) (18 - 18)  SpO2: 98% (23 Aug 2024 04:51) (95% - 99%)    Parameters below as of 23 Aug 2024 04:51  Patient On (Oxygen Delivery Method): room air        PHYSICAL EXAM:  GENERAL: NAD, well-groomed, well-developed  HEAD:  Atraumatic, Normocephalic  EYES: EOMI, PERRLA, conjunctiva and sclera clear  ENMT: No tonsillar erythema, exudates, or enlargement; Moist mucous membranes, Good dentition, No lesions  NECK: Supple, No JVD, Normal thyroid  NERVOUS SYSTEM:  Alert & Oriented X3, Good concentration; Motor Strength 5/5 B/L upper and lower extremities; DTRs 2+ intact and symmetric  PULM: Clear to auscultation bilaterally  CARDIAC: Regular rate and rhythm; No murmurs, rubs, or gallops  GI: Soft, Nontender, Nondistended; Bowel sounds present  EXTREMITIES:  2+ Peripheral Pulses, No clubbing, cyanosis, or edema  LYMPH: No lymphadenopathy noted  SKIN: No rashes or lesions    Consultant(s) Notes Reviewed:  [x ] YES  [ ] NO  Care Discussed with Consultants/Other Providers [ x] YES  [ ] NO    LABS:                    chlorhexidine 2% Cloths 1 Application(s) Topical <User Schedule>  donepezil 20 milliGRAM(s) Oral at bedtime  enoxaparin Injectable 40 milliGRAM(s) SubCutaneous every 24 hours  QUEtiapine 12.5 milliGRAM(s) Oral at bedtime  sertraline 100 milliGRAM(s) Oral daily  simvastatin 20 milliGRAM(s) Oral at bedtime    This is a 79 year old female with History of Alzheimer's Dementia, HTN, PVD, CHB s/p pacemaker, who is presenting for placement.      1. HO Alzheimers dementia   - pleasant   - c/w donepezil 25 mg qhs  - c/w seroquel 12.5 mg bedtime  - c/w zyprexa prn agitation   - f/u social work c/s for placement   - PT/OT recommended [facility]     2.HTN/HLD   - c/w statin  - discontinued amlodipine and HCTZ     3. CHF s/p pacemaker   - EKG paced rhythm       DVT ppx: Lovenox     Pending:  placement  
  Patient is a 79y old  Female who presents with a chief complaint of Placement (20 Aug 2024 11:23)    HPI:  Patient is a 79yr old female with History of Alzheimer's Dementia, HTN, PVD, CHB s/p pacemaker presenting for placement. As per daughter who brought patient in, Patient lives by herself and has been noticed to be frequently wandering around neighborhood often found and brought back by neighbors. Daughter is concerned patient is not eating as home as she has been losing weight and medication bottles seem to be unused. PCP prompted family to bring in patient for placement. Otherwise denies any chest pain, SOB, fever, chills, headache, changes in vision, cough, congestion, n/v/d, abd pain, constipation, urinary complaints, lower extremity pain/swelling.  In the ED patient HD stable, afebrile, Labs and imaging unremarkable    (15 Aug 2024 18:24)    PAST MEDICAL & SURGICAL HISTORY:  H/O: hypertension  Mitral regurgitation  S/P hysterectomy    patient seen and examined independently on morning rounds for the first time today, chart reviewed and discussed with the medicine resident and on interdisciplinary rounds:    hospital day #5  no overnight events-medically ready for discharge pending dispo plan  peer to peer completed at 3:30 pm today ([phone]) reference #[id number]---xeff to peer denied and case managemnet aware (will likely need Long term placement options)    Vital Signs Last 24 Hrs  T(C): 36.4 (20 Aug 2024 05:18), Max: 37.1 (19 Aug 2024 20:34)  T(F): 97.5 (20 Aug 2024 05:18), Max: 98.7 (19 Aug 2024 20:34)  HR: 64 (20 Aug 2024 05:18) (64 - 74)  BP: 101/51 (20 Aug 2024 05:18) (101/51 - 134/77)  BP(mean): --  RR: 18 (20 Aug 2024 05:18) (18 - 18)  SpO2: --    PE:  GEN-NAD, AAOx1 (only oriented to person today)  PULM- CTAB fair air entry  CVS- +s1/s2 RRR  GI- soft NT ND +bs, no rebound, no guarding  EXT- no edema                          13.2   6.59  )-----------( 227      ( 19 Aug 2024 08:04 )             39.2     08-19    143  |  104  |  21<H>  ----------------------------<  95  3.3<L>   |  26  |  0.9    Ca    9.5      19 Aug 2024 08:04  Mg     1.8     08-19    TPro  6.2  /  Alb  4.2  /  TBili  0.6  /  DBili  x   /  AST  14  /  ALT  8   /  AlkPhos  92  08-19        Urinalysis Basic - ( 19 Aug 2024 08:04 )    Color: x / Appearance: x / SG: x / pH: x  Gluc: 95 mg/dL / Ketone: x  / Bili: x / Urobili: x   Blood: x / Protein: x / Nitrite: x   Leuk Esterase: x / RBC: x / WBC x   Sq Epi: x / Non Sq Epi: x / Bacteria: x          MEDICATIONS  (STANDING):  amLODIPine   Tablet 5 milliGRAM(s) Oral daily  donepezil 20 milliGRAM(s) Oral at bedtime  enoxaparin Injectable 40 milliGRAM(s) SubCutaneous every 24 hours  hydrochlorothiazide 25 milliGRAM(s) Oral daily  potassium chloride    Tablet ER 10 milliEquivalent(s) Oral daily  QUEtiapine 12.5 milliGRAM(s) Oral at bedtime  sertraline 100 milliGRAM(s) Oral daily  simvastatin 20 milliGRAM(s) Oral at bedtime  
  ALBANIA ARROYO  79y  Female      Patient is a 79y old  Female who presents with a chief complaint of Placement (16 Aug 2024 16:21)      INTERVAL HPI/OVERNIGHT EVENTS:    pt seen this am   -medically stable for d/c planning   -CM to assist with NH placement     -Vital Signs Last 24 Hrs  T(C): 36.6 (17 Aug 2024 13:35), Max: 36.8 (16 Aug 2024 15:48)  T(F): 97.8 (17 Aug 2024 13:35), Max: 98.3 (16 Aug 2024 15:48)  HR: 72 (17 Aug 2024 13:35) (72 - 78)  BP: 155/75 (17 Aug 2024 13:35) (146/67 - 162/79)  BP(mean): 94 (16 Aug 2024 15:48) (94 - 94)  RR: 18 (17 Aug 2024 13:35) (18 - 18)  SpO2: 98% (16 Aug 2024 20:13) (98% - 99%)    Parameters below as of 16 Aug 2024 21:03  Patient On (Oxygen Delivery Method): room air        PHYSICAL EXAM:  GENERAL: Elderly not in distress   HEAD:  Atraumatic, Normocephalic  EYES: EOMI, PERRLA, conjunctiva and sclera clear  NERVOUS SYSTEM:  awake and not alert (thinks she is home, oriented to person only)  CHEST/LUNG: Clear  air entry  CV/HEART: Regular rate and rhythm  GI/ABDOMEN: Soft abdomen     LAB:                        13.3   6.96  )-----------( 181      ( 16 Aug 2024 06:51 )             39.9     08-16    140  |  104  |  16  ----------------------------<  97  3.8   |  23  |  0.8    Ca    9.3      16 Aug 2024 06:51  Mg     2.0     08-16          Daily Height in cm: 167.64 (16 Aug 2024 21:03)    Daily   CAPILLARY BLOOD GLUCOSE      POCT Blood Glucose.: 94 mg/dL (17 Aug 2024 11:02)    Urinalysis Basic - ( 16 Aug 2024 06:51 )    Color: x / Appearance: x / SG: x / pH: x  Gluc: 97 mg/dL / Ketone: x  / Bili: x / Urobili: x   Blood: x / Protein: x / Nitrite: x   Leuk Esterase: x / RBC: x / WBC x   Sq Epi: x / Non Sq Epi: x / Bacteria: x            RADIOLOGY:    Imaging Personally visualized and Reviewed:  [ y ] YES  [ ] NO      amLODIPine   Tablet 5 milliGRAM(s) Oral daily  donepezil 20 milliGRAM(s) Oral at bedtime  enoxaparin Injectable 40 milliGRAM(s) SubCutaneous every 24 hours  hydrochlorothiazide 25 milliGRAM(s) Oral daily  potassium chloride    Tablet ER 10 milliEquivalent(s) Oral daily  QUEtiapine 12.5 milliGRAM(s) Oral at bedtime  sertraline 100 milliGRAM(s) Oral daily  simvastatin 20 milliGRAM(s) Oral at bedtime

## 2024-08-23 NOTE — PROGRESS NOTE ADULT - PROVIDER SPECIALTY LIST ADULT
Hospitalist
Hospitalist
Internal Medicine
Hospitalist
Internal Medicine
Hospitalist
Hospitalist

## 2024-08-23 NOTE — DISCHARGE NOTE PROVIDER - NSDCFUSCHEDAPPT_GEN_ALL_CORE_FT
[facility] Physician [facility]  CARDIOLOGY [address] [facility]  Scheduled Appointment: 10/17/2024

## 2024-08-23 NOTE — DISCHARGE NOTE PROVIDER - ATTENDING DISCHARGE PHYSICAL EXAMINATION:
VITALS:   T(C): 36.4 (08-23-24 @ 04:51), Max: 36.9 (08-22-24 @ 19:48)  HR: 70 (08-23-24 @ 04:51) (70 - 90)  BP: 94/54 (08-23-24 @ 04:51) (94/54 - 123/73)  RR: 18 (08-23-24 @ 04:51) (18 - 18)  SpO2: 98% (08-23-24 @ 04:51) (95% - 99%)    GENERAL: NAD, lying in bed comfortably  HEART: Regular rate and rhythm,  LUNGS: Unlabored respirations.  Clear to auscultation bilaterally,   ABDOMEN: Soft, nontender, nondistended, +BS  EXTREMITIES: 2+ peripheral pulses bilaterally.   NERVOUS SYSTEM:  A&Ox3,

## 2024-08-23 NOTE — DISCHARGE NOTE NURSING/CASE MANAGEMENT/SOCIAL WORK - PATIENT PORTAL LINK FT
You can access the FollowMyHealth Patient Portal offered by [facility] by registering at the following website: [email or web address]. By joining [employer]’s FollowMyHealth portal, you will also be able to view your health information using other applications (apps) compatible with our system.
You can access the FollowMyHealth Patient Portal offered by [facility] by registering at the following website: [email or web address]. By joining [employer]’s FollowMyHealth portal, you will also be able to view your health information using other applications (apps) compatible with our system.

## 2024-08-23 NOTE — DISCHARGE NOTE NURSING/CASE MANAGEMENT/SOCIAL WORK - NSDCPETBCESMAN_GEN_ALL_CORE
If you are a smoker, it is important for your health to stop smoking. Please be aware that second hand smoke is also harmful.
Detail Level: Detailed
General Sunscreen Counseling: I recommended a broad spectrum sunscreen with a SPF of 30 or higher.  I explained that SPF 30 sunscreens block approximately 97 percent of the sun's harmful rays.  Sunscreens should be applied at least 15 minutes prior to expected sun exposure and then every 2 hours after that as long as sun exposure continues. If swimming or exercising sunscreen should be reapplied every 45 minutes to an hour after getting wet or sweating.  One ounce, or the equivalent of a shot glass full of sunscreen, is adequate to protect the skin not covered by a bathing suit. I also recommended a lip balm with a sunscreen as well. Sun protective clothing can be used in lieu of sunscreen but must be worn the entire time you are exposed to the sun's rays.
If you are a smoker, it is important for your health to stop smoking. Please be aware that second hand smoke is also harmful.

## 2024-08-23 NOTE — DISCHARGE NOTE PROVIDER - NSDCCPCAREPLAN_GEN_ALL_CORE_FT
PRINCIPAL DISCHARGE DIAGNOSIS  Diagnosis: Hospital admission due to social situation  Assessment and Plan of Treatment: Dementia  is an illness that effects a person's memory, judgment, thinking, problem solving, speaking, and communicating. It may cause you to be confused and have problems doing activities such as driving, cooking, or bathing. You may also have changes in how you act and feel. It is a serious illness and usually gets worse. Some types of dementia may be treated with medicine and other care so that the illness develops more slowly.  AFTER YOU LEAVE:  Medicines:  Keep a written list of what medicines and vitamins you take and when and why you take them. Bring the list of your medicines or the pill bottles when you see your caregivers.   Always take your medicine as directed by caregivers.  Do not stop taking your medicine when you feel better. Ask how long you will need to take the medicine. Most people with dementia need to take medicine the rest of their life.  Follow up with your provider for any blood tests that you need.   Coping:  Accepting that you have dementia is hard. You and those close to you may feel angry, sad, or frightened. These are normal feelings. Ask your provider for therapy options.  Types of Therapeutic Sessions:  Couples Therapy  Family Meetings  Group Therapy  Wellness Hints:  Eat healthy foods .  Do not drink alcohol.  Talk to your caregiver before you start exercising.   It is never too late to quit smoking if you smoke.  Regular sleep is very important.   CONTACT A CAREGIVER IF:  You have questions or concerns about dementia or your medicine.  You feel that you are symptoms are getting worse.   You are not able to sleep well or are sleeping more than usual.  You cannot eat or are eating more than usual.  You cannot make it to your next meeting with your caregiver.  SEEK CARE IMMEDIATELY IF:  You are unable to care for yourself.  You think about killing yourself (suicide) or someone else (homicide).  You have very bad side effects, such as rash, swelling, or trouble breathing after taking medicine.        SECONDARY DISCHARGE DIAGNOSES  Diagnosis: Breath shortness  Assessment and Plan of Treatment:

## 2024-08-23 NOTE — DISCHARGE NOTE PROVIDER - HOSPITAL COURSE
Ms. Mavis Dc is a 79yr old female with hx of Alzheimer's Dementia, HTN, PVD, CHB s/p pacemaker presenting for placement. Daughter brought patient in, noted that she lives on her own and has been wandering around neighborhood and needs to be brought back by neighbors more frequently. Daughter is also concerned patient is not eating as home as she has been losing weight and medication bottles seem to be unused. PCP prompted family to bring in patient for placement. Otherwise denies any chest pain, SOB, fever, chills, headache, changes in vision, cough, congestion, n/v/d, abd pain, constipation, urinary complaints, lower extremity pain/swelling.  In the ED patient HD stable, afebrile, Labs and imaging unremarkable. Stable throughout hospital course, will be discharged to nursing facility.        1. HO Alzheimers dementia   - pleasant   - c/w donepezil 25 mg qhs  - c/w seroquel 12.5 mg bedtime    2.HTN/HLD   - c/w statin  - discontinued amlodipine and HCTZ during admission for low bp    3. CHF s/p pacemaker   - EKG paced rhythm

## 2024-08-23 NOTE — PROGRESS NOTE ADULT - NUTRITIONAL ASSESSMENT
This patient has been assessed with a concern for Malnutrition and has been determined to have a diagnosis/diagnoses of Severe protein-calorie malnutrition.    This patient is being managed with:   Diet DASH/TLC-  Sodium & Cholesterol Restricted  Supplement Feeding Modality:  Oral  Ensure Plus High Protein Cans or Servings Per Day:  1       Frequency:  Three Times a day  Entered: Aug 22 2024  1:35PM  

## 2024-08-23 NOTE — DISCHARGE NOTE PROVIDER - NSDCMRMEDTOKEN_GEN_ALL_CORE_FT
donepezil 23 mg oral tablet: 1 tab(s) orally once a day (at bedtime)  QUEtiapine: 12.5 milligram(s) orally once a day (at bedtime)  sertraline 100 mg oral tablet: 1 tab(s) orally once a day  simvastatin 20 mg oral tablet: 1 tab(s) orally once a day (at bedtime)

## 2024-08-23 NOTE — DISCHARGE NOTE NURSING/CASE MANAGEMENT/SOCIAL WORK - NSDCPEFALRISK_GEN_ALL_CORE
For information on Fall & Injury Prevention, visit: [email or web address] OR  [email or web address] OR  https://www.cdc.gov/steadi/patient.html
For information on Fall & Injury Prevention, visit: [email or web address] OR  [email or web address] OR  https://www.cdc.gov/steadi/patient.html

## 2024-09-01 DIAGNOSIS — I10 ESSENTIAL (PRIMARY) HYPERTENSION: ICD-10-CM

## 2024-09-01 DIAGNOSIS — E87.6 HYPOKALEMIA: ICD-10-CM

## 2024-09-01 DIAGNOSIS — Z95.0 PRESENCE OF CARDIAC PACEMAKER: ICD-10-CM

## 2024-09-01 DIAGNOSIS — E43 UNSPECIFIED SEVERE PROTEIN-CALORIE MALNUTRITION: ICD-10-CM

## 2024-09-01 DIAGNOSIS — E78.5 HYPERLIPIDEMIA, UNSPECIFIED: ICD-10-CM

## 2024-09-01 DIAGNOSIS — I73.9 PERIPHERAL VASCULAR DISEASE, UNSPECIFIED: ICD-10-CM

## 2024-09-01 DIAGNOSIS — F02.83 DEMENTIA IN OTHER DISEASES CLASSIFIED ELSEWHERE, UNSPECIFIED SEVERITY, WITH MOOD DISTURBANCE: ICD-10-CM

## 2024-09-01 DIAGNOSIS — I44.2 ATRIOVENTRICULAR BLOCK, COMPLETE: ICD-10-CM

## 2024-09-01 DIAGNOSIS — G30.9 ALZHEIMER'S DISEASE, UNSPECIFIED: ICD-10-CM

## 2024-10-17 ENCOUNTER — APPOINTMENT (OUTPATIENT)
Dept: CARDIOLOGY | Facility: CLINIC | Age: 79
End: 2024-10-17

## 2024-10-23 ENCOUNTER — NON-APPOINTMENT (OUTPATIENT)
Age: 79
End: 2024-10-23

## 2024-10-23 ENCOUNTER — APPOINTMENT (OUTPATIENT)
Dept: CARDIOLOGY | Facility: CLINIC | Age: 79
End: 2024-10-23
Payer: MEDICARE

## 2024-10-23 PROCEDURE — 93294 REM INTERROG EVL PM/LDLS PM: CPT

## 2024-10-23 PROCEDURE — 93296 REM INTERROG EVL PM/IDS: CPT

## 2025-01-22 ENCOUNTER — NON-APPOINTMENT (OUTPATIENT)
Age: 80
End: 2025-01-22

## 2025-01-22 ENCOUNTER — APPOINTMENT (OUTPATIENT)
Dept: CARDIOLOGY | Facility: CLINIC | Age: 80
End: 2025-01-22
Payer: MEDICARE

## 2025-01-22 PROCEDURE — 93294 REM INTERROG EVL PM/LDLS PM: CPT

## 2025-01-22 PROCEDURE — 93296 REM INTERROG EVL PM/IDS: CPT

## 2025-04-25 ENCOUNTER — APPOINTMENT (OUTPATIENT)
Dept: CARDIOLOGY | Facility: CLINIC | Age: 80
End: 2025-04-25

## 2025-04-29 ENCOUNTER — APPOINTMENT (OUTPATIENT)
Dept: CARDIOLOGY | Facility: CLINIC | Age: 80
End: 2025-04-29
Payer: MEDICARE

## 2025-04-29 ENCOUNTER — NON-APPOINTMENT (OUTPATIENT)
Age: 80
End: 2025-04-29

## 2025-04-29 PROCEDURE — 93296 REM INTERROG EVL PM/IDS: CPT

## 2025-04-29 PROCEDURE — 93294 REM INTERROG EVL PM/LDLS PM: CPT

## 2025-05-14 NOTE — H&P ADULT - EXTREMITIES
ED Attending Physician Note      Patient : Kelsey Randall Age: 88 year old Sex: female   MRN: [id number] Encounter Date: 5/13/2025      History         Chief Complaint   Patient presents with    Fall         HPI: 88 year old female presents with intractable pain.  Patient is an 88-year-old female who presents emergency department for evaluation of confusion and intractable pain.  Patient had a recent fall on May 5 which was worked up and required a 5-day hospital stay at an outside facility.  Patient was at [facility].  She had to nondisplaced right-sided rib fractures.  Since discharge home patient has had an unsteady gait where she has to hold onto walls to walk and has been more confused she does not know how to use her phone she does not know how to use the remote control for the TV.  This is different from her baseline.  Patient denies any vomiting or diarrhea.  She was prescribed Norco for pain control at home but she has not been able to  that prescription.  Both patient and son thought that she was discharged early from the outside facility and thought that she required a longer stay.  Patient denies any other additional trauma.    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    Benign paroxysmal positional vertigo     Breast cancer  (CMD)     Right    CAD (coronary artery disease)     Chronic back pain     Dermatitis     Diverticulitis 05/10/2024    Diverticulitis of colon     HTN (hypertension)     Hyperlipidemia     Hypothyroidism     Lateral epicondylitis of right elbow 03/24/2023    Moderate aortic regurgitation     Onychomycosis     Osteopenia     Pes anserine bursitis 01/06/2023    Polymyalgia rheumatica  (CMD)     Sick sinus  syndrome  (CMD)     s/p pacemaker ([employer])    SSS (sick sinus syndrome)  (CMD) 2020    Strain of right biceps 2023    Tympanic membrane perforation     Viral pharyngitis        Past Surgical History:   Procedure Laterality Date    ANESTH,PACEMAKER INSERTION      BREAST SURGERY Left 2023    lumpectomy/ [facility]    CATARACT EXTRACTION, BILATERAL      Dr Crowe     SECTION, CLASSIC  1961    FRACTURE SURGERY Left     arm    MASTECTOMY Right     Dr Cleveland -    Dr Falk    TONSILLECTOMY  1986    TOTAL ABDOM HYSTERECTOMY  1978    fibroids       Family History   Problem Relation Age of Onset    Anemia Mother     Alcohol Abuse Mother     Alcohol Abuse Father         unknown    Patient is unaware of any medical problems Brother         unknown    Cancer Brother         unsure primary    Alcohol Abuse Son     Substance Abuse Son     Congestive Heart Failure Maternal Grandfather     Other Neg Hx         gout    Systemic Lupus Erythematosus Neg Hx     Rheumatoid Arthritis Neg Hx        Social History     Tobacco Use    Smoking status: Former     Current packs/day: 0.00     Average packs/day: 1 pack/day for 10.0 years (10.0 ttl pk-yrs)     Types: Cigarettes     Start date:      Quit date:      Years since quittin.4     Passive exposure: Past    Smokeless tobacco: Never   Vaping Use    Vaping status: never used   Substance Use Topics    Alcohol use: Yes     Alcohol/week: 2.0 standard drinks of alcohol     Types: 2 Glasses of wine per week     Comment: occasional    Drug use: Yes     Types: Prescription Drugs       Review of Systems    No fever.  No new visual complaints.  No new difficulty swallowing.  No new chest pain.  No new shortness of breath.  No new vomiting or diarrhea.  No new urinary complaints.  No new skin rash.  No new localized weakness.  No new depression.  No Diabetes or Thyroid Disease.  All other ROS covered in HPI or not pertinent to current medical  problem.        Physical Exam     ED Triage Vitals [05/13/25 1412]   ED Triage Vitals Group      Temp 96.8 °F (36 °C)      Heart Rate 96      Resp 18      /65      SpO2 92 %      EtCO2 mmHg       Height       Weight       Weight Scale Used       BMI (Calculated)       IBW/kg (Calculated)        Physical Exam:  Physical Exam:  General appearance: The patient is in NAD and nontoxic. VS noted.  HEENT: Conjunctiva pink, sclerae anicteric, thyroid not enlarged.  LUNGS: clear to auscultation bilaterally without any signs of distress.  CV: Regular rate and rhythm without extra sounds appreciated.  ABD: soft, nontender without organomegaly.  MUSCULOSKELETAL: No assymetry or tenderness present. No gross bony deformity.  NEURO: Patient is awake alert with no focal findings appreciated.  SKIN: No obvious rash present.       ED Course       Procedures:     EKG:   Normal sinus rhythm with sinus arrhythmia ID interval of 146 ventricular rate of 95 negative STEMI personally reviewed and interpreted by me    Critical Care     Critical Care:   None    Lab Results     Results for orders placed or performed during the hospital encounter of 05/13/25   Comprehensive Metabolic Panel    Specimen: Blood, Venous   Result Value Ref Range    Fasting Status      Sodium 145 135 - 145 mmol/L    Potassium 4.0 3.4 - 5.1 mmol/L    Chloride 108 97 - 110 mmol/L    Carbon Dioxide 20 (L) 21 - 32 mmol/L    Anion Gap 21 (H) 7 - 19 mmol/L    Glucose 84 70 - 99 mg/dL    BUN 19 6 - 20 mg/dL    Creatinine 0.83 0.51 - 0.95 mg/dL    Glomerular Filtration Rate 68 >=60    BUN/Cr 23 7 - 25    Calcium 9.9 8.4 - 10.2 mg/dL    Bilirubin, Total 0.7 0.2 - 1.0 mg/dL    GOT/AST 26 <=37 Units/L    GPT/ALT 17 <64 Units/L    Alkaline Phosphatase 77 45 - 117 Units/L    Albumin 3.6 3.4 - 5.0 g/dL    Protein, Total 6.7 6.4 - 8.2 g/dL    Globulin 3.1 2.0 - 4.0 g/dL    A/G Ratio 1.2 1.0 - 2.4   TROPONIN I, HIGH SENSITIVITY    Specimen: Blood, Venous   Result Value Ref  Range    Troponin I, High Sensitivity 18 <52 ng/L   CBC with Automated Differential (performable only)    Specimen: Blood, Venous   Result Value Ref Range    WBC 11.5 (H) 4.2 - 11.0 K/mcL    RBC 3.26 (L) 4.00 - 5.20 mil/mcL    HGB 9.7 (L) 12.0 - 15.5 g/dL    HCT 30.6 (L) 36.0 - 46.5 %    MCV 93.9 78.0 - 100.0 fl    MCH 29.8 26.0 - 34.0 pg    MCHC 31.7 (L) 32.0 - 36.5 g/dL    RDW-CV 14.3 11.0 - 15.0 %    RDW-SD 48.0 39.0 - 50.0 fL     140 - 450 K/mcL    NRBC 1 (H) <=0 /100 WBC   Urinalysis & Reflex Microscopy With Culture If Indicated    Specimen: Urine clean catch   Result Value Ref Range    COLOR, URINALYSIS Yellow     APPEARANCE, URINALYSIS Clear     GLUCOSE, URINALYSIS Negative Negative mg/dL    BILIRUBIN, URINALYSIS Negative Negative    KETONES, URINALYSIS 40 (A) Negative mg/dL    SPECIFIC GRAVITY, URINALYSIS 1.020 1.005 - 1.030    OCCULT BLOOD, URINALYSIS Negative Negative    PH, URINALYSIS 5.5 5.0 - 7.0    PROTEIN, URINALYSIS Trace (A) Negative mg/dL    UROBILINOGEN, URINALYSIS 0.2 0.2, 1.0 mg/dL    NITRITE, URINALYSIS Negative Negative    LEUKOCYTE ESTERASE, URINALYSIS Small (A) Negative    SQUAMOUS EPITHELIAL, URINALYSIS None Seen None Seen, 1 to 5 /hpf    ERYTHROCYTES, URINALYSIS 1 to 2 None Seen, 1 to 2 /hpf    LEUKOCYTES, URINALYSIS 6 to 10 (A) None Seen, 1 to 5 /hpf    BACTERIA, URINALYSIS Few (A) None Seen /hpf    HYALINE CASTS, URINALYSIS None Seen None Seen, 1 to 5 /lpf   Manual Differential    Specimen: Blood, Venous   Result Value Ref Range    Neutrophil, Percent 75 %    Lymphocytes, Percent 16 %    Mono, Percent 9 %    Absolute Neutrophil 8.6 (H) 1.8 - 7.7 K/mcL    Absolute Lymphocytes 1.8 1.0 - 4.0 K/mcL    Absolute Monocytes 1.0 (H) 0.3 - 0.9 K/mcL    WBC Morphology Normal Normal    Platelet Morphology Normal Normal    Polychromasia Few          Radiology Results     Imaging Results              CT HEAD WO CONTRAST (Final result)  Result time 05/13/25 19:26:16      Final result                    Impression:        1. No acute intracranial abnormality. No acute hemorrhage, mass lesion or  infarct.  2. Small left frontal scalp hematoma. No underlying fracture.  3. Chronic small vessel ischemic white matter disease.  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       Electronically Signed by: PAUL VAUGHN MD   Signed on: 5/13/2025 7:26 PM   Workstation ID: [id number]               Narrative:    EXAM: CT Head without contrast    HISTORY: Postconcussion syndrome    COMPARISON:  None available.    TECHNIQUE: Multiple transaxial images of the brain were obtained without  intravenous contrast using 5mm slices.  One or more of the following dose reduction techniques were used: Automated  exposure control, adjustment of the mA and/or kV according to patient size,  and/or utilization of iterative reconstruction technique.      FINDINGS:    There is no acute intracranial hemorrhage.     There is no mass, mass effect, midline shift or extra-axial fluid  collection.    Brain parenchymal volume and ventricular caliber are within normal limits.    There is old infarct in the left cerebellar hemisphere. There are areas of  decreased attenuation within subcortical and deep white matter which are  nonspecific.    Paranasal sinuses, mastoid air cells and visualized orbital contents are  within normal limits.     There is a small left frontal scalp hematoma. Bones of the calvarium and  skull base are intact.                                       CT CHEST WO CONTRAST (Final result)  Result time 05/13/25 19:39:39      Final result                   Impression:        1. Small nonspecific pulmonary nodule in the right middle lobe and right  lower lobe too small to adequately characterize    2. Streaky linear and platelike atelectasis versus fibrosis in the lower  lobes    3. Acute and chronic rib fractures as described above    4. Mild probable old compression of L2    5. Redemonstration of left adrenal  myolipoma        Electronically Signed by: CARMELO MG M.D.   Signed on: 5/13/2025 7:39 PM   Workstation ID: [id number]               Narrative:    EXAM: CT CHEST WO CONTRAST    CLINICAL INDICATION: Rib fracture, pain    COMPARISON:  None.     TECHNIQUE: Noncontrast CT chest performed utilizing helical scanning.  Sagittal and coronal reformatted images as well as axial MIPS of the chest  obtained. mA and/or kVp was adjusted for patient size.    FINDINGS: There is a 3 mm pulmonary nodule right middle lobe (image 75,  series 603B). Small subpleural pulmonary nodules present in the right lower  lobe posteriorly. Calcified granulomas present within the lingula. There  are linear and platelike opacities in the lower lobes. No lobar  consolidation, pleural effusion, or pneumothorax.    The mediastinum demonstrates severe aortic calcification. There is severe  coronary arterial calcification. No definite enlarged mediastinal lymph  nodes or abnormal fluid collections. There is a left-sided pacemaker. There  is dense mitral calcification. Surgical hips are present the right axilla.    Limited imaging of the upper abdomen demonstrates no free fluid. There is a  large fat density lesion in the left suprarenal space most likely in  continuity with the adrenal gland measuring 7.6 cm in size. Hyperdense cyst  is present interpolar right kidney.    There is mild compression of L2. There are acute appearing fractures of the  anterior margins of the right fifth and sixth ribs. There are acute  appearing fractures of the fourth and fifth ribs on the left anteriorly.  Multiple additional old fractures are also present..                                       XR CHEST PA AND LATERAL 2 VIEWS (Final result)  Result time 05/13/25 16:19:44      Final result                   Impression:    1. No lung consolidation. Mild cardiomegaly.  2. Additional background chronic appearing findings as described above.    Electronically Signed  by: CASSI OJEDA M.D.   Signed on: 5/13/2025 4:19 PM   Workstation ID: [id number]               Narrative:    EXAM:  CHEST 2 VIEWS    CLINICAL INDICATION:  Chest Pain    COMPARISON: 5/11/2024.    FINDINGS: There is mild cardiomegaly, with normal pulmonary vasculature. A  left-sided cardiac pacer device is in place. Aortic tortuosity with  calcification of the arch is evident.    Surgical screws in the proximal left humerus are partial included in the  field-of-view. There are some surgical clips about the right axillary space  as well. There is no focal lung consolidation, pleural effusion or  pneumothorax.                                      MDM: Patient presents to the emergency department for evaluation of intractable pain after a fall.  Unable to fill her prescription for Norco.  Continue to have confusion and fogginess at home certainly consistent with postconcussion syndrome.  Patient will need repeat scanning of her head and chest and will likely need admission to the hospital.  Patient denies urinary symptoms    Clinical Impression     Patient is a 88 year old female who presents with postconcussion syndrome, intractable pain    Disposition        Admit 5/13/2025  5:44 PM  Telemetry Bed?: No  Admitting Physician: DAWOOD SAINI [[id number]]  Is this a telephone or verbal order?: This is a telephone order from the admitting physician      Marcus Ferrer MD   5/13/2025 7:47 PM                         Marcus Ferrer MD  05/13/25 1947     No cyanosis, clubbing or edema

## 2025-07-29 ENCOUNTER — NON-APPOINTMENT (OUTPATIENT)
Age: 80
End: 2025-07-29

## 2025-07-29 ENCOUNTER — APPOINTMENT (OUTPATIENT)
Dept: CARDIOLOGY | Facility: CLINIC | Age: 80
End: 2025-07-29
Payer: MEDICARE

## 2025-07-29 PROCEDURE — 93294 REM INTERROG EVL PM/LDLS PM: CPT

## 2025-07-29 PROCEDURE — 93296 REM INTERROG EVL PM/IDS: CPT
